# Patient Record
Sex: MALE | Race: WHITE | NOT HISPANIC OR LATINO | Employment: FULL TIME | URBAN - METROPOLITAN AREA
[De-identification: names, ages, dates, MRNs, and addresses within clinical notes are randomized per-mention and may not be internally consistent; named-entity substitution may affect disease eponyms.]

---

## 2018-01-23 ENCOUNTER — OFFICE VISIT CONVERTED (OUTPATIENT)
Dept: SURGERY | Facility: CLINIC | Age: 58
End: 2018-01-23
Attending: SURGERY

## 2018-07-16 ENCOUNTER — OFFICE VISIT CONVERTED (OUTPATIENT)
Dept: SURGERY | Facility: CLINIC | Age: 58
End: 2018-07-16
Attending: SURGERY

## 2018-08-27 ENCOUNTER — OFFICE VISIT CONVERTED (OUTPATIENT)
Dept: SURGERY | Facility: CLINIC | Age: 58
End: 2018-08-27
Attending: SURGERY

## 2021-05-16 VITALS — WEIGHT: 225.5 LBS | BODY MASS INDEX: 29.88 KG/M2 | RESPIRATION RATE: 16 BRPM | HEIGHT: 73 IN

## 2021-05-16 VITALS — HEIGHT: 73 IN | BODY MASS INDEX: 29.16 KG/M2 | RESPIRATION RATE: 14 BRPM | WEIGHT: 220 LBS

## 2021-05-16 VITALS — RESPIRATION RATE: 14 BRPM | BODY MASS INDEX: 29.83 KG/M2 | HEIGHT: 73 IN | WEIGHT: 225.12 LBS

## 2022-06-01 ENCOUNTER — OFFICE VISIT (OUTPATIENT)
Dept: FAMILY MEDICINE CLINIC | Facility: CLINIC | Age: 62
End: 2022-06-01

## 2022-06-01 ENCOUNTER — LAB (OUTPATIENT)
Dept: LAB | Facility: HOSPITAL | Age: 62
End: 2022-06-01

## 2022-06-01 ENCOUNTER — HOSPITAL ENCOUNTER (OUTPATIENT)
Dept: GENERAL RADIOLOGY | Facility: HOSPITAL | Age: 62
Discharge: HOME | End: 2022-06-01

## 2022-06-01 VITALS
TEMPERATURE: 98 F | DIASTOLIC BLOOD PRESSURE: 83 MMHG | HEART RATE: 51 BPM | BODY MASS INDEX: 29.58 KG/M2 | OXYGEN SATURATION: 98 % | SYSTOLIC BLOOD PRESSURE: 137 MMHG | HEIGHT: 73 IN | WEIGHT: 223.2 LBS | RESPIRATION RATE: 20 BRPM

## 2022-06-01 DIAGNOSIS — Z12.5 SCREENING FOR MALIGNANT NEOPLASM OF PROSTATE: ICD-10-CM

## 2022-06-01 DIAGNOSIS — M25.561 ACUTE PAIN OF RIGHT KNEE: ICD-10-CM

## 2022-06-01 DIAGNOSIS — Z13.220 SCREENING, LIPID: ICD-10-CM

## 2022-06-01 DIAGNOSIS — M25.561 ACUTE PAIN OF RIGHT KNEE: Primary | ICD-10-CM

## 2022-06-01 DIAGNOSIS — M25.361 KNEE INSTABILITY, RIGHT: ICD-10-CM

## 2022-06-01 DIAGNOSIS — Z13.29 SCREENING FOR THYROID DISORDER: ICD-10-CM

## 2022-06-01 DIAGNOSIS — B37.49 YEAST DERMATITIS OF PENIS: ICD-10-CM

## 2022-06-01 PROBLEM — K21.9 ESOPHAGEAL REFLUX: Status: ACTIVE | Noted: 2022-06-01

## 2022-06-01 LAB
BASOPHILS # BLD AUTO: 0.05 10*3/MM3 (ref 0–0.2)
BASOPHILS NFR BLD AUTO: 0.9 % (ref 0–1.5)
DEPRECATED RDW RBC AUTO: 42.5 FL (ref 37–54)
EOSINOPHIL # BLD AUTO: 0.11 10*3/MM3 (ref 0–0.4)
EOSINOPHIL NFR BLD AUTO: 1.9 % (ref 0.3–6.2)
ERYTHROCYTE [DISTWIDTH] IN BLOOD BY AUTOMATED COUNT: 12.8 % (ref 12.3–15.4)
HCT VFR BLD AUTO: 46.1 % (ref 37.5–51)
HGB BLD-MCNC: 15.8 G/DL (ref 13–17.7)
IMM GRANULOCYTES # BLD AUTO: 0.02 10*3/MM3 (ref 0–0.05)
IMM GRANULOCYTES NFR BLD AUTO: 0.3 % (ref 0–0.5)
LYMPHOCYTES # BLD AUTO: 2.41 10*3/MM3 (ref 0.7–3.1)
LYMPHOCYTES NFR BLD AUTO: 41.3 % (ref 19.6–45.3)
MCH RBC QN AUTO: 31.6 PG (ref 26.6–33)
MCHC RBC AUTO-ENTMCNC: 34.3 G/DL (ref 31.5–35.7)
MCV RBC AUTO: 92.2 FL (ref 79–97)
MONOCYTES # BLD AUTO: 0.62 10*3/MM3 (ref 0.1–0.9)
MONOCYTES NFR BLD AUTO: 10.6 % (ref 5–12)
NEUTROPHILS NFR BLD AUTO: 2.62 10*3/MM3 (ref 1.7–7)
NEUTROPHILS NFR BLD AUTO: 45 % (ref 42.7–76)
NRBC BLD AUTO-RTO: 0 /100 WBC (ref 0–0.2)
PLATELET # BLD AUTO: 220 10*3/MM3 (ref 140–450)
PMV BLD AUTO: 10.1 FL (ref 6–12)
RBC # BLD AUTO: 5 10*6/MM3 (ref 4.14–5.8)
WBC NRBC COR # BLD: 5.83 10*3/MM3 (ref 3.4–10.8)

## 2022-06-01 PROCEDURE — 80061 LIPID PANEL: CPT

## 2022-06-01 PROCEDURE — 84439 ASSAY OF FREE THYROXINE: CPT

## 2022-06-01 PROCEDURE — 99204 OFFICE O/P NEW MOD 45 MIN: CPT

## 2022-06-01 PROCEDURE — G0103 PSA SCREENING: HCPCS

## 2022-06-01 PROCEDURE — 73562 X-RAY EXAM OF KNEE 3: CPT

## 2022-06-01 PROCEDURE — 80050 GENERAL HEALTH PANEL: CPT

## 2022-06-01 PROCEDURE — 36415 COLL VENOUS BLD VENIPUNCTURE: CPT

## 2022-06-01 PROCEDURE — 84550 ASSAY OF BLOOD/URIC ACID: CPT

## 2022-06-01 RX ORDER — FLUCONAZOLE 150 MG/1
150 TABLET ORAL ONCE
Qty: 1 TABLET | Refills: 0 | Status: SHIPPED | OUTPATIENT
Start: 2022-06-01 | End: 2022-06-01

## 2022-06-01 NOTE — PROGRESS NOTES
There is a small joint effusion which is fluid collection on the knee. This can be caused by arthritis, meniscal injury or ligament injury. This may heal on its own over time or if it continues to be painful over the next few weeks we can try to pull the fluid off with a needle or do the MRI to determine if there is a tear or not in the meniscus.

## 2022-06-01 NOTE — PROGRESS NOTES
Chief Complaint   Patient presents with   • Establish Care     Hasn't been seeing anyone for primary care.    • Knee Pain     Right knee doesn't really hurt but it doesn't feel right, when he stands up to walk he has to take a second to step, started bothering him on Friday, doesn't recall injuring knee.        Subjective          Dat Valencia presents to [facility]    He's here to establish care today. He is having knee achiness. He doesn't know what he did to his knee. He is a  and thinks maybe he stepped on it wrong. He states it started bothering him on Friday. He doesn't remember injuring it in any way. He states the pain is a dull ache. He stands up and needs to stand for a few seconds before taking off. He states it does give out on him sometimes.     He is also complaining of an itchy scrotum and penis sometimes. 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 Its possible he has a yeast infection. I'll try to treat this with diflucan for him.       Past History:  Medical History: has no past medical history on file.   Surgical History: has no past surgical history on file.   Family History: family history is not on file.   Social History: reports that he has never smoked. He has never used smokeless tobacco. He reports that he does not drink alcohol and does not use drugs.  Allergies: Patient has no known allergies.  (Not in a hospital admission)       Social History     Socioeconomic History   • Marital status:    Tobacco Use   • Smoking status: Never Smoker   • Smokeless tobacco: Never Used   Substance and Sexual Activity   • Alcohol use: Never   • Drug use: Never   • Sexual activity: Defer       Health Maintenance Due   Topic Date Due   • HEPATITIS C SCREENING  Never done       Objective     Vital Signs:   /83 (BP Location: Left arm, Patient Position: Sitting)   Pulse 51   Temp 98 °F (36.7 °C)  "(Infrared)   Resp 20   Ht 185.4 cm (73\")   Wt 101 kg (223 lb 3.2 oz)   SpO2 98%   BMI 29.45 kg/m²       Physical Exam  Constitutional:       Appearance: Normal appearance.   HENT:      Nose: Nose normal.      Mouth/Throat:      Mouth: Mucous membranes are moist.   Cardiovascular:      Rate and Rhythm: Normal rate and regular rhythm.      Pulses: Normal pulses.   Pulmonary:      Effort: Pulmonary effort is normal.      Breath sounds: Normal breath sounds.   Musculoskeletal:         General: Signs of injury (right knee instability) present.   Skin:     General: Skin is warm and dry.      Findings: Erythema (scrotum) present.   Neurological:      General: No focal deficit present.      Mental Status: He is alert and oriented to person, place, and time.   Psychiatric:         Mood and Affect: Mood normal.         Behavior: Behavior normal.          Review of Systems   Musculoskeletal: Positive for arthralgias (right knee).        Result Review :                 Assessment and Plan    Diagnoses and all orders for this visit:    1. Acute pain of right knee (Primary)  -     CBC w AUTO Differential; Future  -     Comprehensive metabolic panel; Future  -     Uric acid; Future  -     XR Knee 3 View Right; Future    2. Knee instability, right  -     XR Knee 3 View Right; Future    3. Screening, lipid  -     Lipid panel; Future    4. Screening for thyroid disorder  -     TSH+Free T4; Future    5. Screening for malignant neoplasm of prostate  -     PSA SCREENING; Future    6. Yeast dermatitis of penis  -     fluconazole (Diflucan) 150 MG tablet; Take 1 tablet by mouth 1 (One) Time for 1 dose.  Dispense: 1 tablet; Refill: 0    I have ordered an xray of his knee but informed him he may need an MRI to assess his MCL. He is also going to get lab work done.         Pt thought to be clinically stable at this time.    Follow Up   Return in about 4 weeks (around 6/29/2022).  Patient was given instructions and counseling regarding " his condition or for health maintenance advice. Please see specific information pulled into the AVS if appropriate.

## 2022-06-02 LAB
ALBUMIN SERPL-MCNC: 4.4 G/DL (ref 3.5–5.2)
ALBUMIN/GLOB SERPL: 1.9 G/DL
ALP SERPL-CCNC: 54 U/L (ref 39–117)
ALT SERPL W P-5'-P-CCNC: 28 U/L (ref 1–41)
ANION GAP SERPL CALCULATED.3IONS-SCNC: 9.8 MMOL/L (ref 5–15)
AST SERPL-CCNC: 24 U/L (ref 1–40)
BILIRUB SERPL-MCNC: 0.3 MG/DL (ref 0–1.2)
BUN SERPL-MCNC: 13 MG/DL (ref 8–23)
BUN/CREAT SERPL: 15.1 (ref 7–25)
CALCIUM SPEC-SCNC: 9.5 MG/DL (ref 8.6–10.5)
CHLORIDE SERPL-SCNC: 106 MMOL/L (ref 98–107)
CHOLEST SERPL-MCNC: 174 MG/DL (ref 0–200)
CO2 SERPL-SCNC: 25.2 MMOL/L (ref 22–29)
CREAT SERPL-MCNC: 0.86 MG/DL (ref 0.76–1.27)
EGFRCR SERPLBLD CKD-EPI 2021: 99.1 ML/MIN/1.73
GLOBULIN UR ELPH-MCNC: 2.3 GM/DL
GLUCOSE SERPL-MCNC: 87 MG/DL (ref 65–99)
HDLC SERPL-MCNC: 46 MG/DL (ref 40–60)
LDLC SERPL CALC-MCNC: 100 MG/DL (ref 0–100)
LDLC/HDLC SERPL: 2.09 {RATIO}
POTASSIUM SERPL-SCNC: 4.4 MMOL/L (ref 3.5–5.2)
PROT SERPL-MCNC: 6.7 G/DL (ref 6–8.5)
PSA SERPL-MCNC: 0.73 NG/ML (ref 0–4)
SODIUM SERPL-SCNC: 141 MMOL/L (ref 136–145)
T4 FREE SERPL-MCNC: 0.84 NG/DL (ref 0.93–1.7)
TRIGL SERPL-MCNC: 159 MG/DL (ref 0–150)
TSH SERPL DL<=0.05 MIU/L-ACNC: 3.4 UIU/ML (ref 0.27–4.2)
URATE SERPL-MCNC: 6.1 MG/DL (ref 3.4–7)
VLDLC SERPL-MCNC: 28 MG/DL (ref 5–40)

## 2022-06-27 ENCOUNTER — OFFICE VISIT (OUTPATIENT)
Dept: FAMILY MEDICINE CLINIC | Facility: CLINIC | Age: 62
End: 2022-06-27

## 2022-06-27 VITALS
HEART RATE: 78 BPM | HEIGHT: 73 IN | WEIGHT: 222.1 LBS | BODY MASS INDEX: 29.43 KG/M2 | TEMPERATURE: 98.4 F | OXYGEN SATURATION: 99 % | SYSTOLIC BLOOD PRESSURE: 138 MMHG | DIASTOLIC BLOOD PRESSURE: 89 MMHG | RESPIRATION RATE: 20 BRPM

## 2022-06-27 DIAGNOSIS — M25.561 ACUTE PAIN OF RIGHT KNEE: Primary | ICD-10-CM

## 2022-06-27 DIAGNOSIS — L20.9 ATOPIC DERMATITIS OF SCALP: ICD-10-CM

## 2022-06-27 DIAGNOSIS — B35.6 JOCK ITCH: ICD-10-CM

## 2022-06-27 DIAGNOSIS — B37.2 YEAST DERMATITIS: ICD-10-CM

## 2022-06-27 DIAGNOSIS — M25.361 KNEE INSTABILITY, RIGHT: ICD-10-CM

## 2022-06-27 PROCEDURE — 99214 OFFICE O/P EST MOD 30 MIN: CPT

## 2022-06-27 RX ORDER — FLUOCINONIDE TOPICAL SOLUTION USP, 0.05% 0.5 MG/ML
SOLUTION TOPICAL 2 TIMES DAILY
Qty: 60 ML | Refills: 1 | Status: SHIPPED | OUTPATIENT
Start: 2022-06-27 | End: 2022-07-27

## 2022-06-27 RX ORDER — NYSTATIN 100000 [USP'U]/G
POWDER TOPICAL 3 TIMES DAILY
Qty: 60 G | Refills: 1 | Status: SHIPPED | OUTPATIENT
Start: 2022-06-27 | End: 2022-07-27

## 2022-06-27 RX ORDER — NYSTATIN AND TRIAMCINOLONE ACETONIDE 100000; 1 [USP'U]/G; MG/G
1 OINTMENT TOPICAL 2 TIMES DAILY
Qty: 60 G | Refills: 1 | Status: SHIPPED | OUTPATIENT
Start: 2022-06-27 | End: 2022-07-22

## 2022-06-27 RX ORDER — FLUOCINONIDE TOPICAL SOLUTION USP, 0.05% 0.5 MG/ML
SOLUTION TOPICAL 2 TIMES DAILY
COMMUNITY
End: 2022-06-27 | Stop reason: SDUPTHER

## 2022-06-27 RX ORDER — NYSTATIN 100000 U/G
1 CREAM TOPICAL 2 TIMES DAILY
COMMUNITY
End: 2022-08-10

## 2022-06-27 RX ORDER — ALCLOMETASONE DIPROPIONATE 0.5 MG/G
1 CREAM TOPICAL 2 TIMES DAILY
COMMUNITY
End: 2022-08-10

## 2022-06-27 NOTE — PROGRESS NOTES
Chief Complaint   Patient presents with   • Knee Pain     Right knee pain in the mornings, once he gets up and moving it gets better.    • Itching     Has itching in his groin area and has to use Nystatin and Alclometasone creams. 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        Subjective          Dat Valencia presents to [facility]    He is here for a follow up visit about his groin itching and knee pain. The knee pain is on the right knee. An Xray taken last time showed a small effusion. He still has knee instability so would like an MRI before aspirating the fluid.     He is also still having itching in his groin, under his scrotum, and between his penis and scrotum. He states the skin of his scrotum just feels different and thickened. He is a  and sits for at least 8 hours a day in a . He states he uses baby wipes on his groin every night that he cannot get to a shower.     He also has dermatitis in his scalp and has medication he uses for that. He needs a refill on that medication.      Past History:  Medical History: has no past medical history on file.   Surgical History: has no past surgical history on file.   Family History: family history is not on file.   Social History: reports that he has never smoked. He has never used smokeless tobacco. He reports that he does not drink alcohol and does not use drugs.  Allergies: Patient has no known allergies.  (Not in a hospital admission)       Social History     Socioeconomic History   • Marital status:    Tobacco Use   • Smoking status: Never Smoker   • Smokeless tobacco: Never Used   Substance and Sexual Activity   • Alcohol use: Never   • Drug use: Never   • Sexual activity: Defer       Health Maintenance Due   Topic Date Due   • HEPATITIS C SCREENING  Never done       Objective     Vital Signs:   /89 (BP Location:  "Left arm, Patient Position: Sitting)   Pulse 78   Temp 98.4 °F (36.9 °C) (Infrared)   Resp 20   Ht 185.4 cm (73\")   Wt 101 kg (222 lb 1.6 oz)   SpO2 99%   BMI 29.30 kg/m²       Physical Exam  Constitutional:       Appearance: Normal appearance.   HENT:      Nose: Nose normal.      Mouth/Throat:      Mouth: Mucous membranes are moist.   Cardiovascular:      Rate and Rhythm: Normal rate and regular rhythm.   Pulmonary:      Effort: Pulmonary effort is normal.      Breath sounds: Normal breath sounds.   Musculoskeletal:      Right knee: Tenderness present over the medial joint line.   Skin:     General: Skin is warm and dry.      Findings: Erythema (scrotum) present.   Neurological:      General: No focal deficit present.      Mental Status: He is alert and oriented to person, place, and time.   Psychiatric:         Mood and Affect: Mood normal.         Behavior: Behavior normal.          Review of Systems   Musculoskeletal: Positive for arthralgias (right knee).   Skin: Positive for rash (scrotum/groin).   All other systems reviewed and are negative.       Result Review :                 Assessment and Plan    Diagnoses and all orders for this visit:    1. Acute pain of right knee (Primary)  -     MRI Knee Right Without Contrast; Future    2. Knee instability, right  -     MRI Knee Right Without Contrast; Future    3. Jock itch  -     nystatin-triamcinolone (MYCOLOG) 437475-7.1 UNIT/GM-% ointment; Apply 1 application topically to the appropriate area as directed 2 (Two) Times a Day for 25 days.  Dispense: 60 g; Refill: 1  -     nystatin (MYCOSTATIN) 355614 UNIT/GM powder; Apply  topically to the appropriate area as directed 3 (Three) Times a Day for 30 days.  Dispense: 60 g; Refill: 1    4. Yeast dermatitis  -     nystatin-triamcinolone (MYCOLOG) 733120-5.1 UNIT/GM-% ointment; Apply 1 application topically to the appropriate area as directed 2 (Two) Times a Day for 25 days.  Dispense: 60 g; Refill: 1  -     " nystatin (MYCOSTATIN) 025333 UNIT/GM powder; Apply  topically to the appropriate area as directed 3 (Three) Times a Day for 30 days.  Dispense: 60 g; Refill: 1    5. Atopic dermatitis of scalp  -     fluocinonide (LIDEX) 0.05 % external solution; Apply  topically to the appropriate area as directed 2 (Two) Times a Day for 30 days.  Dispense: 60 mL; Refill: 1    If MRI comes back positive for any tearing, I will order referral to ortho. If it comes back negative we will proceed with aspiration of the effusion in the joint.       Pt thought to be clinically stable at this time.    Follow Up   Return if symptoms worsen or fail to improve.  Patient was given instructions and counseling regarding his condition or for health maintenance advice. Please see specific information pulled into the AVS if appropriate.

## 2022-07-11 ENCOUNTER — TELEPHONE (OUTPATIENT)
Dept: FAMILY MEDICINE CLINIC | Facility: CLINIC | Age: 62
End: 2022-07-11

## 2022-07-11 NOTE — TELEPHONE ENCOUNTER
Caller: LANCE BRIDGES    Relationship: Emergency Contact    Best call back number: [phone]    What orders are you requesting (i.e. lab or imaging): MRI WITHOUT CONTRAST OF RIGHT KNEE    In what timeframe would the patient need to come in: ASAP    Where will you receive your lab/imaging services: [facility]    Additional notes: PATIENT CAN ONLY BE SEEN ON MONDAYS

## 2022-07-11 NOTE — TELEPHONE ENCOUNTER
Left VM for patient to call back.  Referral is in for patient, need to confirm if we are switching the location

## 2022-08-01 ENCOUNTER — HOSPITAL ENCOUNTER (OUTPATIENT)
Dept: MRI IMAGING | Facility: HOSPITAL | Age: 62
Discharge: HOME | End: 2022-08-01

## 2022-08-01 DIAGNOSIS — M25.561 ACUTE PAIN OF RIGHT KNEE: ICD-10-CM

## 2022-08-01 DIAGNOSIS — S83.249A TEAR OF MEDIAL MENISCUS OF KNEE, CURRENT, UNSPECIFIED LATERALITY, UNSPECIFIED TEAR TYPE, INITIAL ENCOUNTER: Primary | ICD-10-CM

## 2022-08-01 DIAGNOSIS — M25.361 KNEE INSTABILITY, RIGHT: ICD-10-CM

## 2022-08-01 PROCEDURE — 73721 MRI JNT OF LWR EXTRE W/O DYE: CPT

## 2022-08-01 NOTE — PROGRESS NOTES
Please read him his results under the impression. All of these tears means he will be sent to orthopedic surgery

## 2022-08-08 ENCOUNTER — OFFICE VISIT (OUTPATIENT)
Dept: ORTHOPEDIC SURGERY | Facility: CLINIC | Age: 62
End: 2022-08-08

## 2022-08-08 ENCOUNTER — PREP FOR SURGERY (OUTPATIENT)
Dept: OTHER | Facility: HOSPITAL | Age: 62
End: 2022-08-08

## 2022-08-08 VITALS — OXYGEN SATURATION: 97 % | BODY MASS INDEX: 30.09 KG/M2 | HEART RATE: 81 BPM | WEIGHT: 227 LBS | HEIGHT: 73 IN

## 2022-08-08 DIAGNOSIS — S83.241A TEAR OF MEDIAL MENISCUS OF RIGHT KNEE, CURRENT, UNSPECIFIED TEAR TYPE, INITIAL ENCOUNTER: Primary | ICD-10-CM

## 2022-08-08 DIAGNOSIS — M17.11 PRIMARY OSTEOARTHRITIS OF RIGHT KNEE: ICD-10-CM

## 2022-08-08 PROCEDURE — 99203 OFFICE O/P NEW LOW 30 MIN: CPT | Performed by: STUDENT IN AN ORGANIZED HEALTH CARE EDUCATION/TRAINING PROGRAM

## 2022-08-08 RX ORDER — CEFAZOLIN SODIUM IN 0.9 % NACL 3 G/100 ML
3 INTRAVENOUS SOLUTION, PIGGYBACK (ML) INTRAVENOUS ONCE
Status: CANCELLED | OUTPATIENT
Start: 2022-08-08 | End: 2022-08-08

## 2022-08-08 RX ORDER — CEFAZOLIN SODIUM 2 G/100ML
2 INJECTION, SOLUTION INTRAVENOUS ONCE
Status: CANCELLED | OUTPATIENT
Start: 2022-08-08 | End: 2022-08-08

## 2022-08-08 NOTE — PROGRESS NOTES
"Chief Complaint  Pain and Initial Evaluation of the Right Knee     Subjective          Dat Valencia presents to [facility] for   History of Present Illness    The patient presents here today for evaluation of the right knee. The patient reports right knee pain that started a couple months ago without injury or trauma. He locates pain to the medial knee. He describes it as a sharp pain at times with popping in the knee. He denies previous surgeries to the knee. He reports pain gets worse throughout the day.  He has tried activity medication, oral, and topical medications without relief.  He works as a .    No Known Allergies     Social History     Socioeconomic History   • Marital status:    Tobacco Use   • Smoking status: Never Smoker   • Smokeless tobacco: Never Used   Substance and Sexual Activity   • Alcohol use: Never   • Drug use: Never   • Sexual activity: Defer        I reviewed the patient's chief complaint, history of present illness, review of systems, past medical history, surgical history, family history, social history, medications, and allergy list.     REVIEW OF SYSTEMS    Constitutional: Denies fevers, chills, weight loss  Cardiovascular: Denies chest pain, shortness of breath  Skin: Denies rashes, acute skin changes  Neurologic: Denies headache, loss of consciousness  MSK: Right knee pain      Objective   Vital Signs:   Pulse 81   Ht 185.4 cm (73\")   Wt 103 kg (227 lb)   SpO2 97%   BMI 29.95 kg/m²     Body mass index is 29.95 kg/m².    Physical Exam    General: Alert. No acute distress.   Right knee- Stable to varus/valgus stress. Stable to anterior/posterior drawer. Negative Lachman's. Positive Denise's to the medial knee with a pop. Neurovascularly intact. Positive EHL, FHL, GS and TA. Sensation intact to all 5 nerves of the foot. Positive pulses. Intact knee Extensor Mechanism. Good strength to hamstrings, quadriceps, dorsiflexors, and " plantar flexors.  Tender to the medial joint line. ROM -3 to 120 degrees. Positive crepitus. Slight varus alignment.     Procedures    Imaging Results (Most Recent)     None                   Assessment and Plan        MRI Knee Right Without Contrast    Result Date: 8/1/2022  Narrative: PROCEDURE: MRI KNEE RIGHT  WO CONTRAST  COMPARISON: None  INDICATIONS: RIGHT KNEE INSTABILITY, MEDIAL KNEE PAIN FOR SIX WEEKS      TECHNIQUE: A complete multi-planar MRI was performed.   FINDINGS:  The cruciate ligaments, collateral ligaments, and extensor mechanism of the knee are intact.  There is thickening of the medial collateral ligament with surrounding edema.  There is a complex tear of the posterior horn and body of the medial meniscus with extrusion.  There is degeneration of the lateral meniscus with a probable small vertical tear of the apex of the body with blunting present (series 4, image 11).  There is a small joint effusion.  There is a large partially ruptured Baker's cyst.  Moderate tricompartmental osteoarthritic changes are present, most pronounced within the medial compartment.  Diffuse near full-thickness cartilage loss is seen overlying the medial femoral condyle and the medial tibial plateau.  No full-thickness defect identified.  Partial thickness fissures are present within the lateral compartment and the patellofemoral compartment.  No significant intra-articular body identified.  No displaced osteochondral fragments are seen. No significant focal abnormal bone marrow signal is identified. The cortical margins are intact. No significant abnormal focal fluid collection is observed within the surrounding soft tissues.      Impression:   1. Complex tear of the posterior horn and body of the medial meniscus with extrusion. 2. Small vertical tear of the apex of the body of the lateral meniscus. 3. Moderate tricompartmental osteoarthritis, most pronounced within the medial compartment. 4. Thickening of the  medial collateral ligament with surrounding edema, likely reactive and related to remote sprain. 5.  Small joint effusion with a large partially ruptured Baker's cyst..     GAURAV ALMONTE MD       Electronically Signed and Approved By: GAURAV ALMONTE MD on 8/01/2022 at 8:11                Diagnoses and all orders for this visit:    1. Tear of medial meniscus of right knee, current, unspecified tear type, initial encounter (Primary)    2. Primary osteoarthritis of right knee        Discussed the treatment options with the patient, operative vs non-operative. Discussed the risks and benefits of a right knee arthroscopic partial medial menisectomy and chondroplasty. The patient expressed understanding and wished to proceed.        Discussed surgery., Risks/benefits discussed with patient including, but not limited to: infection, bleeding, neurovascular damage, re-rupture, aesthetic deformity, need for further surgery, and death., Discussed with patient the implant type being used during surgery and patient understands and desires to proceed., Surgery pamphlet given. and Call or return if worsening symptoms.    Scribed for Bry Betts MD by Corina Banks  08/08/2022   08:08 EDT         Follow Up   Return for 2 weeks postop .  Patient was given instructions and counseling regarding his condition or for health maintenance advice. Please see specific information pulled into the AVS if appropriate.       I have personally performed the services described in this document as scribed by the above individual and it is both accurate and complete.     Bry Betts MD  08/08/22  08:13 EDT

## 2022-08-08 NOTE — H&P (VIEW-ONLY)
"Chief Complaint  Pain and Initial Evaluation of the Right Knee     Subjective          Dat Valencia presents to [facility] for   History of Present Illness    The patient presents here today for evaluation of the right knee. The patient reports right knee pain that started a couple months ago without injury or trauma. He locates pain to the medial knee. He describes it as a sharp pain at times with popping in the knee. He denies previous surgeries to the knee. He reports pain gets worse throughout the day.  He has tried activity medication, oral, and topical medications without relief.  He works as a .    No Known Allergies     Social History     Socioeconomic History   • Marital status:    Tobacco Use   • Smoking status: Never Smoker   • Smokeless tobacco: Never Used   Substance and Sexual Activity   • Alcohol use: Never   • Drug use: Never   • Sexual activity: Defer        I reviewed the patient's chief complaint, history of present illness, review of systems, past medical history, surgical history, family history, social history, medications, and allergy list.     REVIEW OF SYSTEMS    Constitutional: Denies fevers, chills, weight loss  Cardiovascular: Denies chest pain, shortness of breath  Skin: Denies rashes, acute skin changes  Neurologic: Denies headache, loss of consciousness  MSK: Right knee pain      Objective   Vital Signs:   Pulse 81   Ht 185.4 cm (73\")   Wt 103 kg (227 lb)   SpO2 97%   BMI 29.95 kg/m²     Body mass index is 29.95 kg/m².    Physical Exam    General: Alert. No acute distress.   Right knee- Stable to varus/valgus stress. Stable to anterior/posterior drawer. Negative Lachman's. Positive Denise's to the medial knee with a pop. Neurovascularly intact. Positive EHL, FHL, GS and TA. Sensation intact to all 5 nerves of the foot. Positive pulses. Intact knee Extensor Mechanism. Good strength to hamstrings, quadriceps, dorsiflexors, and " plantar flexors.  Tender to the medial joint line. ROM -3 to 120 degrees. Positive crepitus. Slight varus alignment.     Procedures    Imaging Results (Most Recent)     None                   Assessment and Plan        MRI Knee Right Without Contrast    Result Date: 8/1/2022  Narrative: PROCEDURE: MRI KNEE RIGHT  WO CONTRAST  COMPARISON: None  INDICATIONS: RIGHT KNEE INSTABILITY, MEDIAL KNEE PAIN FOR SIX WEEKS      TECHNIQUE: A complete multi-planar MRI was performed.   FINDINGS:  The cruciate ligaments, collateral ligaments, and extensor mechanism of the knee are intact.  There is thickening of the medial collateral ligament with surrounding edema.  There is a complex tear of the posterior horn and body of the medial meniscus with extrusion.  There is degeneration of the lateral meniscus with a probable small vertical tear of the apex of the body with blunting present (series 4, image 11).  There is a small joint effusion.  There is a large partially ruptured Baker's cyst.  Moderate tricompartmental osteoarthritic changes are present, most pronounced within the medial compartment.  Diffuse near full-thickness cartilage loss is seen overlying the medial femoral condyle and the medial tibial plateau.  No full-thickness defect identified.  Partial thickness fissures are present within the lateral compartment and the patellofemoral compartment.  No significant intra-articular body identified.  No displaced osteochondral fragments are seen. No significant focal abnormal bone marrow signal is identified. The cortical margins are intact. No significant abnormal focal fluid collection is observed within the surrounding soft tissues.      Impression:   1. Complex tear of the posterior horn and body of the medial meniscus with extrusion. 2. Small vertical tear of the apex of the body of the lateral meniscus. 3. Moderate tricompartmental osteoarthritis, most pronounced within the medial compartment. 4. Thickening of the  medial collateral ligament with surrounding edema, likely reactive and related to remote sprain. 5.  Small joint effusion with a large partially ruptured Baker's cyst..     GAURAV ALMONTE MD       Electronically Signed and Approved By: GAURAV ALMOTNE MD on 8/01/2022 at 8:11                Diagnoses and all orders for this visit:    1. Tear of medial meniscus of right knee, current, unspecified tear type, initial encounter (Primary)    2. Primary osteoarthritis of right knee        Discussed the treatment options with the patient, operative vs non-operative. Discussed the risks and benefits of a right knee arthroscopic partial medial menisectomy and chondroplasty. The patient expressed understanding and wished to proceed.        Discussed surgery., Risks/benefits discussed with patient including, but not limited to: infection, bleeding, neurovascular damage, re-rupture, aesthetic deformity, need for further surgery, and death., Discussed with patient the implant type being used during surgery and patient understands and desires to proceed., Surgery pamphlet given. and Call or return if worsening symptoms.    Scribed for Bry Betts MD by Corina Banks  08/08/2022   08:08 EDT         Follow Up   Return for 2 weeks postop .  Patient was given instructions and counseling regarding his condition or for health maintenance advice. Please see specific information pulled into the AVS if appropriate.       I have personally performed the services described in this document as scribed by the above individual and it is both accurate and complete.     Bry Betts MD  08/08/22  08:13 EDT

## 2022-08-10 RX ORDER — ACETAMINOPHEN 500 MG
500 TABLET ORAL EVERY 6 HOURS PRN
COMMUNITY

## 2022-08-10 NOTE — PRE-PROCEDURE INSTRUCTIONS
Patient instructed to have no food past midnight, clears up to 2 hours prior to arrival time. Patient instructed to wear no lotions, jewelry or piercing's day of surgery. Patient to shower am of surgery with surgical soap.

## 2022-08-11 ENCOUNTER — LAB (OUTPATIENT)
Dept: LAB | Facility: HOSPITAL | Age: 62
End: 2022-08-11

## 2022-08-11 DIAGNOSIS — S83.241A TEAR OF MEDIAL MENISCUS OF RIGHT KNEE, CURRENT, UNSPECIFIED TEAR TYPE, INITIAL ENCOUNTER: ICD-10-CM

## 2022-08-11 LAB — SARS-COV-2 RNA PNL SPEC NAA+PROBE: NOT DETECTED

## 2022-08-11 PROCEDURE — U0004 COV-19 TEST NON-CDC HGH THRU: HCPCS

## 2022-08-16 ENCOUNTER — ANESTHESIA (OUTPATIENT)
Dept: PERIOP | Facility: HOSPITAL | Age: 62
End: 2022-08-16

## 2022-08-16 ENCOUNTER — HOSPITAL ENCOUNTER (OUTPATIENT)
Facility: HOSPITAL | Age: 62
Setting detail: HOSPITAL OUTPATIENT SURGERY
Discharge: HOME | End: 2022-08-16
Attending: STUDENT IN AN ORGANIZED HEALTH CARE EDUCATION/TRAINING PROGRAM | Admitting: STUDENT IN AN ORGANIZED HEALTH CARE EDUCATION/TRAINING PROGRAM

## 2022-08-16 ENCOUNTER — ANESTHESIA EVENT (OUTPATIENT)
Dept: PERIOP | Facility: HOSPITAL | Age: 62
End: 2022-08-16

## 2022-08-16 VITALS
SYSTOLIC BLOOD PRESSURE: 93 MMHG | OXYGEN SATURATION: 92 % | BODY MASS INDEX: 28.21 KG/M2 | TEMPERATURE: 97.2 F | DIASTOLIC BLOOD PRESSURE: 61 MMHG | HEIGHT: 74 IN | WEIGHT: 219.8 LBS | HEART RATE: 62 BPM | RESPIRATION RATE: 18 BRPM

## 2022-08-16 DIAGNOSIS — S83.241A TEAR OF MEDIAL MENISCUS OF RIGHT KNEE, CURRENT, UNSPECIFIED TEAR TYPE, INITIAL ENCOUNTER: ICD-10-CM

## 2022-08-16 PROCEDURE — 25010000002 FENTANYL CITRATE (PF) 50 MCG/ML SOLUTION: Performed by: NURSE ANESTHETIST, CERTIFIED REGISTERED

## 2022-08-16 PROCEDURE — 25010000002 ONDANSETRON PER 1 MG: Performed by: NURSE ANESTHETIST, CERTIFIED REGISTERED

## 2022-08-16 PROCEDURE — 29880 ARTHRS KNE SRG MNISECTMY M&L: CPT | Performed by: PHYSICIAN ASSISTANT

## 2022-08-16 PROCEDURE — 25010000002 MIDAZOLAM PER 1 MG: Performed by: ANESTHESIOLOGY

## 2022-08-16 PROCEDURE — 25010000002 CEFAZOLIN IN DEXTROSE 2-4 GM/100ML-% SOLUTION: Performed by: STUDENT IN AN ORGANIZED HEALTH CARE EDUCATION/TRAINING PROGRAM

## 2022-08-16 PROCEDURE — 29880 ARTHRS KNE SRG MNISECTMY M&L: CPT | Performed by: STUDENT IN AN ORGANIZED HEALTH CARE EDUCATION/TRAINING PROGRAM

## 2022-08-16 PROCEDURE — 25010000002 KETOROLAC TROMETHAMINE PER 15 MG: Performed by: NURSE ANESTHETIST, CERTIFIED REGISTERED

## 2022-08-16 PROCEDURE — 25010000002 DEXAMETHASONE PER 1 MG: Performed by: NURSE ANESTHETIST, CERTIFIED REGISTERED

## 2022-08-16 PROCEDURE — 25010000002 PROPOFOL 10 MG/ML EMULSION: Performed by: NURSE ANESTHETIST, CERTIFIED REGISTERED

## 2022-08-16 RX ORDER — DEXMEDETOMIDINE HYDROCHLORIDE 100 UG/ML
INJECTION, SOLUTION INTRAVENOUS AS NEEDED
Status: DISCONTINUED | OUTPATIENT
Start: 2022-08-16 | End: 2022-08-16 | Stop reason: SURG

## 2022-08-16 RX ORDER — MEPERIDINE HYDROCHLORIDE 25 MG/ML
12.5 INJECTION INTRAMUSCULAR; INTRAVENOUS; SUBCUTANEOUS
Status: DISCONTINUED | OUTPATIENT
Start: 2022-08-16 | End: 2022-08-16 | Stop reason: HOSPADM

## 2022-08-16 RX ORDER — MIDAZOLAM HYDROCHLORIDE 1 MG/ML
2 INJECTION INTRAMUSCULAR; INTRAVENOUS ONCE
Status: COMPLETED | OUTPATIENT
Start: 2022-08-16 | End: 2022-08-16

## 2022-08-16 RX ORDER — MAGNESIUM HYDROXIDE 1200 MG/15ML
LIQUID ORAL AS NEEDED
Status: DISCONTINUED | OUTPATIENT
Start: 2022-08-16 | End: 2022-08-16 | Stop reason: HOSPADM

## 2022-08-16 RX ORDER — PROMETHAZINE HYDROCHLORIDE 12.5 MG/1
25 TABLET ORAL ONCE AS NEEDED
Status: DISCONTINUED | OUTPATIENT
Start: 2022-08-16 | End: 2022-08-16 | Stop reason: HOSPADM

## 2022-08-16 RX ORDER — CEFAZOLIN SODIUM 2 G/100ML
2 INJECTION, SOLUTION INTRAVENOUS ONCE
Status: COMPLETED | OUTPATIENT
Start: 2022-08-16 | End: 2022-08-16

## 2022-08-16 RX ORDER — ONDANSETRON 2 MG/ML
INJECTION INTRAMUSCULAR; INTRAVENOUS AS NEEDED
Status: DISCONTINUED | OUTPATIENT
Start: 2022-08-16 | End: 2022-08-16 | Stop reason: SURG

## 2022-08-16 RX ORDER — ONDANSETRON 2 MG/ML
4 INJECTION INTRAMUSCULAR; INTRAVENOUS ONCE AS NEEDED
Status: DISCONTINUED | OUTPATIENT
Start: 2022-08-16 | End: 2022-08-16 | Stop reason: HOSPADM

## 2022-08-16 RX ORDER — HYDROCODONE BITARTRATE AND ACETAMINOPHEN 5; 325 MG/1; MG/1
1 TABLET ORAL EVERY 4 HOURS PRN
Qty: 30 TABLET | Refills: 0 | Status: SHIPPED | OUTPATIENT
Start: 2022-08-16 | End: 2023-03-27

## 2022-08-16 RX ORDER — CEFAZOLIN SODIUM IN 0.9 % NACL 3 G/100 ML
3 INTRAVENOUS SOLUTION, PIGGYBACK (ML) INTRAVENOUS ONCE
Status: DISCONTINUED | OUTPATIENT
Start: 2022-08-16 | End: 2022-08-16 | Stop reason: SDUPTHER

## 2022-08-16 RX ORDER — OXYCODONE HYDROCHLORIDE 5 MG/1
5 TABLET ORAL
Status: DISCONTINUED | OUTPATIENT
Start: 2022-08-16 | End: 2022-08-16 | Stop reason: HOSPADM

## 2022-08-16 RX ORDER — SODIUM CHLORIDE, SODIUM LACTATE, POTASSIUM CHLORIDE, CALCIUM CHLORIDE 600; 310; 30; 20 MG/100ML; MG/100ML; MG/100ML; MG/100ML
9 INJECTION, SOLUTION INTRAVENOUS CONTINUOUS PRN
Status: DISCONTINUED | OUTPATIENT
Start: 2022-08-16 | End: 2022-08-16 | Stop reason: HOSPADM

## 2022-08-16 RX ORDER — ACETAMINOPHEN 500 MG
1000 TABLET ORAL ONCE
Status: COMPLETED | OUTPATIENT
Start: 2022-08-16 | End: 2022-08-16

## 2022-08-16 RX ORDER — BUPIVACAINE HYDROCHLORIDE 2.5 MG/ML
INJECTION, SOLUTION EPIDURAL; INFILTRATION; INTRACAUDAL AS NEEDED
Status: DISCONTINUED | OUTPATIENT
Start: 2022-08-16 | End: 2022-08-16 | Stop reason: HOSPADM

## 2022-08-16 RX ORDER — ONDANSETRON 4 MG/1
4 TABLET, FILM COATED ORAL EVERY 8 HOURS PRN
Qty: 30 TABLET | Refills: 0 | Status: SHIPPED | OUTPATIENT
Start: 2022-08-16

## 2022-08-16 RX ORDER — LIDOCAINE HYDROCHLORIDE 20 MG/ML
INJECTION, SOLUTION EPIDURAL; INFILTRATION; INTRACAUDAL; PERINEURAL AS NEEDED
Status: DISCONTINUED | OUTPATIENT
Start: 2022-08-16 | End: 2022-08-16 | Stop reason: SURG

## 2022-08-16 RX ORDER — KETOROLAC TROMETHAMINE 30 MG/ML
INJECTION, SOLUTION INTRAMUSCULAR; INTRAVENOUS AS NEEDED
Status: DISCONTINUED | OUTPATIENT
Start: 2022-08-16 | End: 2022-08-16 | Stop reason: SURG

## 2022-08-16 RX ORDER — DEXAMETHASONE SODIUM PHOSPHATE 4 MG/ML
INJECTION, SOLUTION INTRA-ARTICULAR; INTRALESIONAL; INTRAMUSCULAR; INTRAVENOUS; SOFT TISSUE AS NEEDED
Status: DISCONTINUED | OUTPATIENT
Start: 2022-08-16 | End: 2022-08-16 | Stop reason: SURG

## 2022-08-16 RX ORDER — FENTANYL CITRATE 50 UG/ML
INJECTION, SOLUTION INTRAMUSCULAR; INTRAVENOUS AS NEEDED
Status: DISCONTINUED | OUTPATIENT
Start: 2022-08-16 | End: 2022-08-16 | Stop reason: SURG

## 2022-08-16 RX ORDER — DOCUSATE SODIUM 100 MG/1
100 CAPSULE, LIQUID FILLED ORAL 2 TIMES DAILY PRN
Qty: 20 CAPSULE | Refills: 0 | Status: SHIPPED | OUTPATIENT
Start: 2022-08-16

## 2022-08-16 RX ORDER — PROPOFOL 10 MG/ML
VIAL (ML) INTRAVENOUS AS NEEDED
Status: DISCONTINUED | OUTPATIENT
Start: 2022-08-16 | End: 2022-08-16 | Stop reason: SURG

## 2022-08-16 RX ORDER — PROMETHAZINE HYDROCHLORIDE 25 MG/1
25 SUPPOSITORY RECTAL ONCE AS NEEDED
Status: DISCONTINUED | OUTPATIENT
Start: 2022-08-16 | End: 2022-08-16 | Stop reason: HOSPADM

## 2022-08-16 RX ADMIN — KETOROLAC TROMETHAMINE 30 MG: 30 INJECTION, SOLUTION INTRAMUSCULAR; INTRAVENOUS at 07:23

## 2022-08-16 RX ADMIN — DEXAMETHASONE SODIUM PHOSPHATE 4 MG: 4 INJECTION, SOLUTION INTRA-ARTICULAR; INTRALESIONAL; INTRAMUSCULAR; INTRAVENOUS; SOFT TISSUE at 07:23

## 2022-08-16 RX ADMIN — FENTANYL CITRATE 100 MCG: 50 INJECTION, SOLUTION INTRAMUSCULAR; INTRAVENOUS at 07:15

## 2022-08-16 RX ADMIN — LIDOCAINE HYDROCHLORIDE 50 MG: 20 INJECTION, SOLUTION EPIDURAL; INFILTRATION; INTRACAUDAL; PERINEURAL at 07:22

## 2022-08-16 RX ADMIN — DEXMEDETOMIDINE HYDROCHLORIDE 200 MCG: 100 INJECTION, SOLUTION, CONCENTRATE INTRAVENOUS at 07:38

## 2022-08-16 RX ADMIN — SODIUM CHLORIDE, POTASSIUM CHLORIDE, SODIUM LACTATE AND CALCIUM CHLORIDE 9 ML/HR: 600; 310; 30; 20 INJECTION, SOLUTION INTRAVENOUS at 06:44

## 2022-08-16 RX ADMIN — CEFAZOLIN SODIUM 2 G: 2 INJECTION, SOLUTION INTRAVENOUS at 07:14

## 2022-08-16 RX ADMIN — PROPOFOL 150 MG: 10 INJECTION, EMULSION INTRAVENOUS at 07:15

## 2022-08-16 RX ADMIN — MIDAZOLAM HYDROCHLORIDE 2 MG: 2 INJECTION, SOLUTION INTRAMUSCULAR; INTRAVENOUS at 06:58

## 2022-08-16 RX ADMIN — LIDOCAINE HYDROCHLORIDE 50 MG: 20 INJECTION, SOLUTION EPIDURAL; INFILTRATION; INTRACAUDAL; PERINEURAL at 07:15

## 2022-08-16 RX ADMIN — ACETAMINOPHEN 1000 MG: 500 TABLET ORAL at 06:44

## 2022-08-16 RX ADMIN — ONDANSETRON 4 MG: 2 INJECTION INTRAMUSCULAR; INTRAVENOUS at 07:23

## 2022-08-16 NOTE — DISCHARGE INSTRUCTIONS
[facility]  Postop Instructions  Outpatient Knee Surgery    DIET  Begin with clear liquids and light foods (jello, soups, etc).  Progress to your normal diet if you are not nauseated.  Take pain medicine with food - crackers, bread, etc.    WOUND CARE  Maintain your operative dressing, loosen bandage if swelling or tingling of the ankle or toes.  It is normal for the knee to bleed and swell from the surgery site.  If blood soaks onto the bandage, do not become alarmed; reinforce with additional dressing.  If blood saturates more than 2 bandages call [facility].  Remove surgical dressing on the 2nd post-operative day.  If minimal drainage is present, apply bandaids over incisions.  Do not use antibiotic ointment.  To avoid infection, keep surgical incisions clean and dry.  You may shower on the 2nd day.  No immersion of operative leg until instructed by staff.    MEDICATIONS  Pain medication is injected into the wound and knee joint during surgery.  This will wear off within 8-12 hours.  Aleve 500mg 2 times per day may be taken for pain if you do not have a history of bleeding or ulcers.  Some patients will require narcotic pain medication for a short period of time.  This can be taken as per directions on the bottle.  Potential narcotic side effects include: constipation, nausea, vomiting, sleepiness.  If nausea and vomiting continue for more than 12-24 hours, contact the office to have your medication changed.  Do not drive a car or operate machinery while taking the prescription pain medication.  Increase vegetables, whole grains, and water intake to decrease risk of constipation related to pain medications.  Drink a full glass of water with every dose of medication (prescription or over the counter) and take with food.    ACTIVITY  When sleeping or resting, elevate the leg with the support of a few pillows at the ankle to reduce swelling and pain.  Do not engage in impact activities which increase  pain/swelling over the first 7-10 days following surgery.  Avoid long periods of sitting or long distance traveling for 2 weeks.  No driving or operating heavy machinery until you are off all prescription pain medications.  You may return to sedentary work or school 1-3 days after surgery, if pain is tolerable.          WEIGHT BEARING and RANGE OF MOTION  Meniscectomy / chondroplasty: Weight bearing as tolerated.      ICE THERAPY  Begin icing immediately after surgery using the compression machine or cold packs.    EXERCISE (see below)  Knee flexion as tolerated 5-10 minutes, 4 times per day.  Begin knee exercises the following day after surgery unless otherwise instructed by the surgeon  Towel Roll extensions 3 times per day for 20 minutes  Knee Flexion Progression 3 times per day  Straight Leg Raises- Hold for 2 seconds, repeat 10 reps, 3 times per day  You may also begin ankle and foot range of motion on the first post-operative day about 2-3 times per day.            Straight Leg Raises                                        Towel Extensions                           EMERGENCIES  Contact [facility] if any of the following are present:  Painful swelling or numbness, tingling, color change, or coolness in the ankle or foot  Unrelenting pain  Fever over 101 (It is normal to have a low grad fever for the first day or two following surgery)  Redness or worsening pain around incisions  Continuous drainage or bleeding from incision (a small amount of drainage is expected)  Difficulty breathing, wheezing  Excessive nausea/vomiting causing inability to keep anything down for 12-24 hours  Inability to urinate    WHAT TO EXPECT AT YOUR FIRST POST OPERATIVE VISIT  Suture/stitches will be removed and new bandage applied.  Follow up Xrays will be taken if indicated.  Next follow-up visit will usually be scheduled for 4-6 weeks     DISCHARGE INSTRUCTIONS  SURGICAL / AMBULATORY  PROCEDURES      For your surgery you  had:  General anesthesia (you may have a sore throat for the first 24 hours)  You may experience dizziness, drowsiness, or light-headedness for several hours following surgery/procedure.  Do not stay alone today or tonight.  Limit your activity for 24 hours.  Resume your diet slowly.  Follow whatever special dietary instructions you may have been given by your doctor.  You should not drive or operate machinery, drink alcohol, or sign legally binding documents for 24 hours or while you are taking pain medication.  Last dose of pain medication was given at:   .  NOTIFY YOUR DOCTOR IF YOU EXPERIENCE ANY OF THE FOLLOWING:  Temperature greater than 101 degrees Fahrenheit  Shaking Chills  Redness or excessive drainage from incision  Nausea, vomiting and/or pain that is not controlled by prescribed medications  Increase in bleeding or bleeding that is excessive  Unable to urinate in 6 hours after surgery  If unable to reach your doctor, please go to the closest Emergency Room  SPECIAL INSTRUCTIONS:  MAY TAKE AN OVER THE COUNTER STOOL SOFTENER AS NEEDED

## 2022-08-16 NOTE — ANESTHESIA POSTPROCEDURE EVALUATION
Patient: Dat Valencia    Procedure Summary     Date: 08/16/22 Room / Location: [facility]  / [facility]    Anesthesia Start: 0711 Anesthesia Stop: 0757    Procedure: RIGHT KNEE ARTHROSCOPY WITH PARTIAL MEDIALAND PARTIAL LATERAL MENISCECTOMY ,CHONDROPLASTY (Right ) Diagnosis:       Tear of medial meniscus of right knee, current, unspecified tear type, initial encounter      (Tear of medial meniscus of right knee, current, unspecified tear type, initial encounter [S83.867A])    Surgeons: Bry Betts MD Provider: Krunal Tariq MD    Anesthesia Type: general ASA Status: 2          Anesthesia Type: general    Vitals  Vitals Value Taken Time   BP 82/59 08/16/22 0827   Temp 36.4 °C (97.6 °F) 08/16/22 0812   Pulse 61 08/16/22 0828   Resp 18 08/16/22 0812   SpO2 92 % 08/16/22 0828   Vitals shown include unvalidated device data.        Post Anesthesia Care and Evaluation    Patient location during evaluation: bedside  Patient participation: complete - patient participated  Level of consciousness: awake  Pain management: adequate    Airway patency: patent  Anesthetic complications: No anesthetic complications  PONV Status: none  Cardiovascular status: acceptable and stable  Respiratory status: acceptable and room air  Hydration status: acceptable    Comments: An Anesthesiologist personally participated in the most demanding procedures (including induction and emergence if applicable) in the anesthesia plan, monitored the course of anesthesia administration at frequent intervals and remained physically present and available for immediate diagnosis and treatment of emergencies.

## 2022-08-16 NOTE — INTERVAL H&P NOTE
H&P reviewed. The patient was examined and there are no changes to the H&P.       I have seen and examined the above patient and agree with the plan.     Cardiac: Intact peripheral pulses  Pulmonary: Nonlabored respirations on room air.   Right lower extremity: Skin intact.  Distal neurovascular intact.      We reviewed the risk, benefits, indications, and alternatives to right knee arthroscopy with partial medial meniscectomy and possible chondroplasty.  Patient elected to proceed and consent was obtained.      Electronically signed by Bry Betts MD, 08/16/22, 6:39 AM EDT.

## 2022-08-16 NOTE — OP NOTE
KNEE ARTHROSCOPY WITH MENISCECTOMY  Procedure Report    Patient Name:  Dat Valencia  YOB: 1960    Date of Surgery:  8/16/2022     Indications: Mr. Valencia is a 62-year-old male with a history of right knee pain.  This was atraumatic in onset.  History and exam are concerning for a medial meniscus tear.  He attempted nonoperative measures without relief.  An MRI was obtained and confirmed a medial meniscus tear as well as a possible lateral meniscus tear.  He had areas of chondrosis throughout the knee as well.  We discussed treatment options.  We discussed both operative and nonoperative management.  The risk, benefits, indications, and alternatives to right knee arthroscopy with partial medial meniscectomy and possible chondroplasty were discussed with the patient.  This was previously documented.  He elected to proceed with surgery and consent was obtained.    Pre-op Diagnosis:   Tear of medial meniscus of right knee, current, unspecified tear type, initial encounter [S83.241A]       Post-Op Diagnosis Codes:     * Tear of medial meniscus of right knee, current, unspecified tear type, initial encounter [S83.241A]    Procedure/CPT® Codes:      Procedure(s):  RIGHT KNEE ARTHROSCOPY WITH PARTIAL MEDIAL AND PARTIAL LATERAL MENISCECTOMY ,CHONDROPLASTY    Staff:  Surgeon(s):  Bry Betts MD    Assistant: JOHNY Simmons    Anesthesia: General    Estimated Blood Loss: 20 mL    Implants:    Nothing was implanted during the procedure    Specimen:          None        Findings: Grade 2 chondrosis patellofemoral compartment, grade 2 and central grade 3 chondrosis 50% weightbearing portion medial femoral condyle, grade 2 and central grade 3 chondrosis 50% medial tibial plateau, complex tear medial meniscus posterior horn and body, grade 2 chondrosis central 30% lateral tibial plateau, radial tear lateral meniscus body    Complications: None    Description of Procedure: The patient was met in the  preoperative holding area and the operative extremity was marked.  The patient was transported to the operating room and laid supine on the operating room table.  General anesthesia was induced without complication.  An upper thigh tourniquet was applied but not used during the case.  The right leg was placed into a leg staley and the foot of the table was dropped.  All extremities were well padded.  2 g of preoperative Ancef were administered.  The right right lower extremity was then prepped and draped in the usual sterile fashion.  A timeout was taken to ensure the appropriate patient, procedure, and procedural site.  All were in agreement.  I began by marking the superficial landmarks over the anterior aspect of the right knee.  A vertical incision for a standard lateral portal was created and the arthroscope was inserted into the patellofemoral compartment.  Grade 2 chondral changes were noticed in the patellofemoral compartment. The arthroscope was transitioned into the lateral gutter and no loose bodies were identified.  The arthroscope was transitioned into the medial gutter and no loose bodies were identified.  The arthroscope was then transitioned into the medial compartment.  A vertical incision for a medial working portal was created after needle localization.  The arthroscopic shaver was introduced and the fat pad was debrided.  A probe was introduced in the medial compartment was evaluated.    Grade 2 and grade 3 chondral changes were noted both on the medial femoral condyle and medial tibial plateau.  A medial meniscus tear involving the posterior horn and body was identified.  I used the arthroscopic meniscal biter and shaver to perform a partial medial meniscectomy taking this tear back to a stable border.  I removed the majority of the posterior horn and tapered this into the body.  I utilized the arthroscopic shaver to perform a chondroplasty on the medial femoral condyle, removing loose/unstable  cartilage flaps. I was satisfied with the work in the medial compartment.  The arthroscope was transitioned into the notch.  The ACL and PCL were identified and were intact.  I then transitioned into the lateral compartment.    Grade 2 chondral changes were noted on the lateral tibial plateau.  A radial tear of the lateral meniscus body was identified.  I utilized the arthroscopic shaver to perform a partial lateral meniscectomy.  I removed the inner 10% of the lateral meniscus body and tapered this into the anterior and posterior horn. I then transitioned back into the patellofemoral compartment.  The knee was then thoroughly irrigated and the arthroscopic instrumentation was removed.  The knee was drained of arthroscopic fluid.  Portal sites were closed with 4-0 nylon in interrupted fashion.  I injected 30 cc of 0.5% Marcaine into the portal sites.  The wound was dressed with Xeroform, fluffs, soft roll, and an Ace wrap from the foot to the proximal thigh.  The patient awoke from anesthesia without complication and was transferred to the recovery room in stable condition.  All surgical counts were correct.  The patient had a palpable dorsalis pedis pulse prior to leaving the operating room.    Assistant: JOHNY Simmons was present and her skilled assistance was necessary for patient positioning, manipulating the camera, closing, dressing application.      Bry Betts MD     Date: 8/16/2022  Time: 07:59 EDT

## 2022-08-16 NOTE — ANESTHESIA PREPROCEDURE EVALUATION
Anesthesia Evaluation     Patient summary reviewed and Nursing notes reviewed   no history of anesthetic complications:  NPO Solid Status: > 8 hours  NPO Liquid Status: > 2 hours           Airway   Mallampati: II  TM distance: >3 FB  Neck ROM: full  No difficulty expected  Dental    (+) edentulous    Pulmonary - negative pulmonary ROS and normal exam    breath sounds clear to auscultation  Cardiovascular - negative cardio ROS and normal exam  Exercise tolerance: good (4-7 METS)    Rhythm: regular  Rate: normal        Neuro/Psych- negative ROS  GI/Hepatic/Renal/Endo    (+)  GERD,      Musculoskeletal (-) negative ROS    Abdominal    Substance History - negative use     OB/GYN negative ob/gyn ROS         Other - negative ROS                       Anesthesia Plan    ASA 2     general       Anesthetic plan, risks, benefits, and alternatives have been provided, discussed and informed consent has been obtained with: patient and spouse/significant other.        CODE STATUS:

## 2022-08-19 ENCOUNTER — TELEPHONE (OUTPATIENT)
Dept: FAMILY MEDICINE CLINIC | Facility: CLINIC | Age: 62
End: 2022-08-19

## 2022-08-19 NOTE — TELEPHONE ENCOUNTER
Caller: Dat Valencia    Relationship: Self    Best call back number: [phone]    What is the best time to reach you: ANYTIME     Who are you requesting to speak with (clinical staff, provider,  specific staff member): CLINICAL       What was the call regarding:       THE PATIENT SAID HIS KNEE IS FEELING GOOD AND HE IS NOT TAKING ANY PAIN MEDICATION BECAUSE HE DO NOT NEED IT. HE SAID HE IS NOT IN ANY PAIN. HE SAID IT GETS A LITTLE STIFF BUT NO PAIN. THE PATIENT SAID HE IS READY TO GO BACK TO WORK. 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   HE SAID HE WILL NEED TO GET HIS STITCHES  TAKEN OUT. HE SAID HE WILL NEED DOCUMENTATION FROM PCP TO RELEASE HIM TO GO BACK TO WORK. HE IS WANTING TO PICK IT UP Monday      PLEASE CALL TO DISCUSS .         Do you require a callback: YES

## 2022-08-22 NOTE — TELEPHONE ENCOUNTER
Spoke with patient, let him know to contact his surgeons office to verify when he could get stitches out since they are the ones who put them in and to ask them when he could return to work. His next follow up with surgeon is 08/29  Patient verbalized understanding

## 2022-08-29 ENCOUNTER — OFFICE VISIT (OUTPATIENT)
Dept: ORTHOPEDIC SURGERY | Facility: CLINIC | Age: 62
End: 2022-08-29

## 2022-08-29 VITALS — WEIGHT: 220 LBS | HEIGHT: 74 IN | BODY MASS INDEX: 28.23 KG/M2

## 2022-08-29 DIAGNOSIS — M17.11 PRIMARY OSTEOARTHRITIS OF RIGHT KNEE: ICD-10-CM

## 2022-08-29 DIAGNOSIS — Z98.890 S/P ARTHROSCOPIC KNEE SURGERY: Primary | ICD-10-CM

## 2022-08-29 PROCEDURE — 99024 POSTOP FOLLOW-UP VISIT: CPT | Performed by: PHYSICIAN ASSISTANT

## 2022-08-29 NOTE — PROGRESS NOTES
"Chief Complaint  Follow-up of the Right Knee    Subjective          Dat Valencia presents to [facility] for   History of Present Illness     Dat Valencia presents today for follow up of his right knee. Patient is 2 weeks post operative right knee arthroscopy with partial medial and partial lateral meniscectomy and chondroplasty performed by Dr. Betts on 2022. Today, patient states he is doing well.  He reports no pain.  He is not taking any medications for pain.  He states he has been working on his home exercises and noticed improvement in his range of motion.  He affirms minimal swelling.  Denies numbness or tingling.  Denies complications, redness, or drainage from his incision sites.  Denies fever or chills.      No Known Allergies     Social History     Socioeconomic History   • Marital status:    Tobacco Use   • Smoking status: Former Smoker     Quit date:      Years since quittin.6   • Smokeless tobacco: Never Used   Vaping Use   • Vaping Use: Never used   Substance and Sexual Activity   • Alcohol use: Never   • Drug use: Never   • Sexual activity: Defer        I reviewed the patient's chief complaint, history of present illness, review of systems, past medical history, surgical history, family history, social history, medications, and allergy list.     REVIEW OF SYSTEMS    Constitutional: Denies fevers, chills, weight loss  Cardiovascular: Denies chest pain, shortness of breath  Skin: Denies rashes, acute skin changes  Neurologic: Denies headache, loss of consciousness  MSK: Right knee pain      Objective   Vital Signs:   Ht 188 cm (74\")   Wt 99.8 kg (220 lb)   BMI 28.25 kg/m²     Body mass index is 28.25 kg/m².    Physical Exam    General: Alert, no acute distress  Right lower extremity: Sutures removed today without complications.  Well-healing arthroscopic portals about the right knee. No redness or active drainage noted.  Minimal knee " effusion.  No tenderness to the medial joint line.  No tenderness to the lateral joint line.  115 degrees of flexion.  5 degrees shy of full knee extension. Knee extensor mechanism intact. Knee stable to varus and valgus stress. Calf soft, nontender. Demonstrates active ankle plantarflexion and dorsiflexion. Sensation intact over the dorsal and plantar foot.  Palpable pedal pulses.       Procedures    Imaging Results (Most Recent)     None                   Assessment and Plan    Diagnoses and all orders for this visit:    1. S/P arthroscopic knee surgery (Primary)    2. Primary osteoarthritis of right knee         Dat Valencia presents today 2 weeks postop right knee arthroscopy with partial medial and partial lateral meniscectomy and chondroplasty performed Dr. Betts on 8/16/2022.  Sutures removed today without complications.  Incisions are well-healing.  No active drainage or redness noted.  No concerning signs of infection.  Patient is doing well and denies fever or chills.  Incision site care was reviewed today. Patient instructed not to soak or submerge incision. Do not apply any lotions, creams, or ointments to the incision at this time.  We discussed concerning signs and symptoms regarding the incision site.  Patient understood and agreed. We discussed formal physical therapy versus home exercises.  Patient understood and elected to proceed with home exercises.  We discussed the importance of these exercises to improve range of motion and strength.  Patient understood and agreed.  We discussed ice and elevation as needed for inflammation.  Work note written today.  Patient will follow up in 4 weeks for reevaluation. No new x-rays needed at next visit.       Call or return if symptoms worsen or patient has any concerns.       Follow Up   Return in about 4 weeks (around 9/26/2022).  Patient was given instructions and counseling regarding his condition or for health maintenance advice. Please see specific  information pulled into the AVS if appropriate.     Charlene He PA-C  08/29/22  10:23 EDT

## 2022-10-03 ENCOUNTER — OFFICE VISIT (OUTPATIENT)
Dept: ORTHOPEDIC SURGERY | Facility: CLINIC | Age: 62
End: 2022-10-03

## 2022-10-03 VITALS — BODY MASS INDEX: 28.23 KG/M2 | OXYGEN SATURATION: 98 % | WEIGHT: 220 LBS | HEIGHT: 74 IN | HEART RATE: 76 BPM

## 2022-10-03 DIAGNOSIS — Z98.890 S/P ARTHROSCOPIC KNEE SURGERY: Primary | ICD-10-CM

## 2022-10-03 DIAGNOSIS — M17.11 PRIMARY OSTEOARTHRITIS OF RIGHT KNEE: ICD-10-CM

## 2022-10-03 PROCEDURE — 99024 POSTOP FOLLOW-UP VISIT: CPT | Performed by: PHYSICIAN ASSISTANT

## 2022-10-03 RX ORDER — FLUOCINONIDE TOPICAL SOLUTION USP, 0.05% 0.5 MG/ML
SOLUTION TOPICAL 2 TIMES DAILY
COMMUNITY
Start: 2022-09-28 | End: 2022-10-31 | Stop reason: SDUPTHER

## 2022-10-03 NOTE — PROGRESS NOTES
"Chief Complaint  Follow-up and Pain of the Right Knee    Subjective          Dat Valencia presents to [facility] for   History of Present Illness    Dat Valencia presents today for a follow up of his right knee. Patient is 6 weeks post op right knee arthroscopy. Today, patient states he is doing well.  He reports no pain to the knee except with an occasional twisting position.  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  He denies swelling.  He is not having to take any medications for pain to the knee.  Denies numbness or tingling.  Denies new injuries.  He states his incisions are well-healed.      No Known Allergies     Social History     Socioeconomic History   • Marital status:    Tobacco Use   • Smoking status: Former Smoker     Quit date:      Years since quittin.7   • Smokeless tobacco: Never Used   Vaping Use   • Vaping Use: Never used   Substance and Sexual Activity   • Alcohol use: Never   • Drug use: Never   • Sexual activity: Defer        I reviewed the patient's chief complaint, history of present illness, review of systems, past medical history, surgical history, family history, social history, medications, and allergy list.     REVIEW OF SYSTEMS    Constitutional: Denies fevers, chills, weight loss  Cardiovascular: Denies chest pain, shortness of breath  Skin: Denies rashes, acute skin changes  Neurologic: Denies headache, loss of consciousness  MSK: Right knee pain      Objective   Vital Signs:   Pulse 76   Ht 188 cm (74\")   Wt 99.8 kg (220 lb)   SpO2 98%   BMI 28.25 kg/m²     Body mass index is 28.25 kg/m².    Physical Exam    General: Alert. No acute distress.   Right lower extremity: Well-healed arthroscopy portals.  Nontender to the medial or lateral joint line.  No swelling.  Just shy of full knee extension.  Knee flexion 120 degrees.  Knee stable to varus and valgus stress.  Knee extensor mechanism intact.  No " pain with Denise testing.  Calf soft, nontender.  Demonstrates active ankle plantarflexion and dorsiflexion.  Sensation intact over the dorsal and plantar foot.  Palpable pedal pulses.    Procedures    Imaging Results (Most Recent)     None                 Assessment and Plan    Diagnoses and all orders for this visit:    1. S/P arthroscopic knee surgery (Primary)    2. Primary osteoarthritis of right knee        Dat Valencia presents today 6 weeks post op right knee arthroscopy.  Patient is instructed to continue with his home exercises.  Continue working on range of motion and strength.  Continue with activities as tolerated.  Work note written today.  Patient will follow-up as needed.    Call or return if symptoms worsen or patient has any concerns.       Follow Up   Return if symptoms worsen or fail to improve.  Patient was given instructions and counseling regarding his condition or for health maintenance advice. Please see specific information pulled into the AVS if appropriate.     Charlene He PA-C  10/03/22  08:08 EDT

## 2022-10-31 ENCOUNTER — OFFICE VISIT (OUTPATIENT)
Dept: FAMILY MEDICINE CLINIC | Facility: CLINIC | Age: 62
End: 2022-10-31

## 2022-10-31 VITALS
RESPIRATION RATE: 20 BRPM | WEIGHT: 225.2 LBS | TEMPERATURE: 98.2 F | DIASTOLIC BLOOD PRESSURE: 97 MMHG | OXYGEN SATURATION: 96 % | SYSTOLIC BLOOD PRESSURE: 149 MMHG | HEIGHT: 74 IN | BODY MASS INDEX: 28.9 KG/M2 | HEART RATE: 75 BPM

## 2022-10-31 DIAGNOSIS — B35.6 JOCK ITCH: Primary | ICD-10-CM

## 2022-10-31 DIAGNOSIS — L20.9 ATOPIC DERMATITIS OF SCALP: ICD-10-CM

## 2022-10-31 DIAGNOSIS — B37.2 YEAST DERMATITIS: ICD-10-CM

## 2022-10-31 PROCEDURE — 99214 OFFICE O/P EST MOD 30 MIN: CPT

## 2022-10-31 RX ORDER — CLOTRIMAZOLE AND BETAMETHASONE DIPROPIONATE 10; .64 MG/G; MG/G
1 CREAM TOPICAL 2 TIMES DAILY
Qty: 42 G | Refills: 0 | Status: SHIPPED | OUTPATIENT
Start: 2022-10-31 | End: 2022-11-21

## 2022-10-31 RX ORDER — FLUOCINONIDE TOPICAL SOLUTION USP, 0.05% 0.5 MG/ML
SOLUTION TOPICAL 2 TIMES DAILY
Qty: 60 ML | Refills: 3 | Status: SHIPPED | OUTPATIENT
Start: 2022-10-31 | End: 2022-11-30

## 2022-10-31 RX ORDER — FLUCONAZOLE 200 MG/1
200 TABLET ORAL DAILY
Qty: 14 TABLET | Refills: 0 | Status: SHIPPED | OUTPATIENT
Start: 2022-10-31 | End: 2022-11-14

## 2022-10-31 RX ORDER — NYSTATIN AND TRIAMCINOLONE ACETONIDE 100000; 1 [USP'U]/G; MG/G
1 OINTMENT TOPICAL 2 TIMES DAILY
Qty: 50 G | Refills: 0 | Status: CANCELLED | OUTPATIENT
Start: 2022-10-31 | End: 2022-11-25

## 2022-10-31 NOTE — PROGRESS NOTES
Chief Complaint   Patient presents with   • Itching     Itching in groin for a couple of years, has been seen for this and will do as told and it will go away for a couple of months but then come back.        Subjective          Dat Valencia presents to [facility]    History of Present Illness  He is here to be seen for itching in the groin. He's had this for a couple of years now and has used different creams and powders. It will go away for a bit and then come right back. He does not want to see dermatology for this yet. He wants to try a different medication first.       Past History:  Medical History: has a past medical history of COVID-19 (2022) and Medial meniscus tear.   Surgical History: has a past surgical history that includes Appendectomy; Fracture surgery; Colonoscopy; and Knee arthroscopy w/ meniscectomy (Right, 2022).   Family History: family history is not on file.   Social History: reports that he quit smoking about 9 years ago. His smoking use included cigarettes. He started smoking about 36 years ago. He has a 13.50 pack-year smoking history. He has never used smokeless tobacco. He reports that he does not drink alcohol and does not use drugs.  Allergies: Patient has no known allergies.  (Not in a hospital admission)       Social History     Socioeconomic History   • Marital status:    Tobacco Use   • Smoking status: Former     Packs/day: 0.50     Years: 27.00     Pack years: 13.50     Types: Cigarettes     Start date:      Quit date: 2013     Years since quittin.8   • Smokeless tobacco: Never   Vaping Use   • Vaping Use: Never used   Substance and Sexual Activity   • Alcohol use: Never   • Drug use: Never   • Sexual activity: Defer       There are no preventive care reminders to display for this patient.    Objective     Vital Signs:   /97 (BP Location: Left arm, Patient Position: Sitting)   Pulse 75   Temp 98.2 °F (36.8 °C)  "(Infrared)   Resp 20   Ht 188 cm (74\")   Wt 102 kg (225 lb 3.2 oz)   SpO2 96%   BMI 28.91 kg/m²       Physical Exam  Constitutional:       Appearance: Normal appearance.   HENT:      Nose: Nose normal.      Mouth/Throat:      Mouth: Mucous membranes are moist.   Cardiovascular:      Rate and Rhythm: Normal rate and regular rhythm.   Pulmonary:      Effort: Pulmonary effort is normal.      Breath sounds: Normal breath sounds.   Skin:     General: Skin is warm and dry.      Findings: Erythema and rash present.   Neurological:      General: No focal deficit present.      Mental Status: He is alert and oriented to person, place, and time.   Psychiatric:         Mood and Affect: Mood normal.         Behavior: Behavior normal.          Review of Systems   Skin: Positive for rash.   All other systems reviewed and are negative.       Result Review :                 Assessment and Plan    Diagnoses and all orders for this visit:    1. Jock itch (Primary)  -     clotrimazole-betamethasone (Lotrisone) 1-0.05 % cream; Apply 1 application topically to the appropriate area as directed 2 (Two) Times a Day for 21 days.  Dispense: 42 g; Refill: 0  -     fluconazole (Diflucan) 200 MG tablet; Take 1 tablet by mouth Daily for 14 days.  Dispense: 14 tablet; Refill: 0    2. Yeast dermatitis  -     clotrimazole-betamethasone (Lotrisone) 1-0.05 % cream; Apply 1 application topically to the appropriate area as directed 2 (Two) Times a Day for 21 days.  Dispense: 42 g; Refill: 0  -     fluconazole (Diflucan) 200 MG tablet; Take 1 tablet by mouth Daily for 14 days.  Dispense: 14 tablet; Refill: 0    3. Atopic dermatitis of scalp  -     fluocinonide (LIDEX) 0.05 % external solution; Apply  topically to the appropriate area as directed 2 (Two) Times a Day for 30 days.  Dispense: 60 mL; Refill: 3  -     fluconazole (Diflucan) 200 MG tablet; Take 1 tablet by mouth Daily for 14 days.  Dispense: 14 tablet; Refill: 0    If not improving in the " next month he will need a referral to Dermatology.         Pt thought to be clinically stable at this time.    Follow Up   No follow-ups on file.  Patient was given instructions and counseling regarding his condition or for health maintenance advice. Please see specific information pulled into the AVS if appropriate.

## 2023-03-27 ENCOUNTER — OFFICE VISIT (OUTPATIENT)
Dept: FAMILY MEDICINE CLINIC | Facility: CLINIC | Age: 63
End: 2023-03-27
Payer: COMMERCIAL

## 2023-03-27 ENCOUNTER — LAB (OUTPATIENT)
Dept: LAB | Facility: HOSPITAL | Age: 63
End: 2023-03-27
Payer: COMMERCIAL

## 2023-03-27 ENCOUNTER — TELEPHONE (OUTPATIENT)
Dept: FAMILY MEDICINE CLINIC | Facility: CLINIC | Age: 63
End: 2023-03-27

## 2023-03-27 ENCOUNTER — HOSPITAL ENCOUNTER (OUTPATIENT)
Dept: GENERAL RADIOLOGY | Facility: HOSPITAL | Age: 63
Discharge: HOME | End: 2023-03-27
Payer: COMMERCIAL

## 2023-03-27 VITALS
HEART RATE: 76 BPM | DIASTOLIC BLOOD PRESSURE: 95 MMHG | HEIGHT: 74 IN | BODY MASS INDEX: 29.2 KG/M2 | WEIGHT: 227.5 LBS | OXYGEN SATURATION: 96 % | RESPIRATION RATE: 20 BRPM | TEMPERATURE: 98.4 F | SYSTOLIC BLOOD PRESSURE: 139 MMHG

## 2023-03-27 DIAGNOSIS — R03.0 ELEVATED BLOOD PRESSURE, SITUATIONAL: ICD-10-CM

## 2023-03-27 DIAGNOSIS — M25.561 ACUTE PAIN OF RIGHT KNEE: ICD-10-CM

## 2023-03-27 DIAGNOSIS — Z13.29 SCREENING FOR THYROID DISORDER: ICD-10-CM

## 2023-03-27 DIAGNOSIS — L29.9 ITCHY SCALP: ICD-10-CM

## 2023-03-27 DIAGNOSIS — B35.6 JOCK ITCH: Primary | ICD-10-CM

## 2023-03-27 DIAGNOSIS — Z13.220 SCREENING, LIPID: ICD-10-CM

## 2023-03-27 LAB
ALBUMIN SERPL-MCNC: 4.2 G/DL (ref 3.5–5.2)
ALBUMIN/GLOB SERPL: 1.6 G/DL
ALP SERPL-CCNC: 68 U/L (ref 39–117)
ALT SERPL W P-5'-P-CCNC: 33 U/L (ref 1–41)
ANION GAP SERPL CALCULATED.3IONS-SCNC: 8 MMOL/L (ref 5–15)
AST SERPL-CCNC: 26 U/L (ref 1–40)
BASOPHILS # BLD AUTO: 0.04 10*3/MM3 (ref 0–0.2)
BASOPHILS NFR BLD AUTO: 0.7 % (ref 0–1.5)
BILIRUB SERPL-MCNC: 0.4 MG/DL (ref 0–1.2)
BUN SERPL-MCNC: 10 MG/DL (ref 8–23)
BUN/CREAT SERPL: 10.4 (ref 7–25)
CALCIUM SPEC-SCNC: 8.8 MG/DL (ref 8.6–10.5)
CHLORIDE SERPL-SCNC: 106 MMOL/L (ref 98–107)
CHOLEST SERPL-MCNC: 171 MG/DL (ref 0–200)
CO2 SERPL-SCNC: 26 MMOL/L (ref 22–29)
CREAT SERPL-MCNC: 0.96 MG/DL (ref 0.76–1.27)
DEPRECATED RDW RBC AUTO: 41.2 FL (ref 37–54)
EGFRCR SERPLBLD CKD-EPI 2021: 90.5 ML/MIN/1.73
EOSINOPHIL # BLD AUTO: 0.16 10*3/MM3 (ref 0–0.4)
EOSINOPHIL NFR BLD AUTO: 2.7 % (ref 0.3–6.2)
ERYTHROCYTE [DISTWIDTH] IN BLOOD BY AUTOMATED COUNT: 12.3 % (ref 12.3–15.4)
GLOBULIN UR ELPH-MCNC: 2.7 GM/DL
GLUCOSE SERPL-MCNC: 108 MG/DL (ref 65–99)
HCT VFR BLD AUTO: 46.3 % (ref 37.5–51)
HDLC SERPL-MCNC: 43 MG/DL (ref 40–60)
HGB BLD-MCNC: 16.3 G/DL (ref 13–17.7)
IMM GRANULOCYTES # BLD AUTO: 0.06 10*3/MM3 (ref 0–0.05)
IMM GRANULOCYTES NFR BLD AUTO: 1 % (ref 0–0.5)
LDLC SERPL CALC-MCNC: 104 MG/DL (ref 0–100)
LDLC/HDLC SERPL: 2.36 {RATIO}
LYMPHOCYTES # BLD AUTO: 1.66 10*3/MM3 (ref 0.7–3.1)
LYMPHOCYTES NFR BLD AUTO: 28.2 % (ref 19.6–45.3)
MCH RBC QN AUTO: 32.3 PG (ref 26.6–33)
MCHC RBC AUTO-ENTMCNC: 35.2 G/DL (ref 31.5–35.7)
MCV RBC AUTO: 91.7 FL (ref 79–97)
MONOCYTES # BLD AUTO: 0.52 10*3/MM3 (ref 0.1–0.9)
MONOCYTES NFR BLD AUTO: 8.8 % (ref 5–12)
NEUTROPHILS NFR BLD AUTO: 3.44 10*3/MM3 (ref 1.7–7)
NEUTROPHILS NFR BLD AUTO: 58.6 % (ref 42.7–76)
NRBC BLD AUTO-RTO: 0 /100 WBC (ref 0–0.2)
PLATELET # BLD AUTO: 217 10*3/MM3 (ref 140–450)
PMV BLD AUTO: 10.1 FL (ref 6–12)
POTASSIUM SERPL-SCNC: 4.3 MMOL/L (ref 3.5–5.2)
PROT SERPL-MCNC: 6.9 G/DL (ref 6–8.5)
RBC # BLD AUTO: 5.05 10*6/MM3 (ref 4.14–5.8)
SODIUM SERPL-SCNC: 140 MMOL/L (ref 136–145)
T4 FREE SERPL-MCNC: 0.96 NG/DL (ref 0.93–1.7)
TRIGL SERPL-MCNC: 132 MG/DL (ref 0–150)
TSH SERPL DL<=0.05 MIU/L-ACNC: 2.98 UIU/ML (ref 0.27–4.2)
VLDLC SERPL-MCNC: 24 MG/DL (ref 5–40)
WBC NRBC COR # BLD: 5.88 10*3/MM3 (ref 3.4–10.8)

## 2023-03-27 PROCEDURE — 36415 COLL VENOUS BLD VENIPUNCTURE: CPT

## 2023-03-27 PROCEDURE — 73562 X-RAY EXAM OF KNEE 3: CPT

## 2023-03-27 PROCEDURE — 80050 GENERAL HEALTH PANEL: CPT

## 2023-03-27 PROCEDURE — 84439 ASSAY OF FREE THYROXINE: CPT

## 2023-03-27 PROCEDURE — 80061 LIPID PANEL: CPT

## 2023-03-27 RX ORDER — NYSTATIN 100000 U/G
1 CREAM TOPICAL 2 TIMES DAILY
COMMUNITY
End: 2023-03-27 | Stop reason: SDUPTHER

## 2023-03-27 RX ORDER — ALCLOMETASONE DIPROPIONATE 0.5 MG/G
1 CREAM TOPICAL 2 TIMES DAILY
Qty: 60 G | Refills: 2 | Status: SHIPPED | OUTPATIENT
Start: 2023-03-27

## 2023-03-27 RX ORDER — FLUOCINONIDE TOPICAL SOLUTION USP, 0.05% 0.5 MG/ML
SOLUTION TOPICAL 2 TIMES DAILY
COMMUNITY
End: 2023-03-27 | Stop reason: SDUPTHER

## 2023-03-27 RX ORDER — FLUOCINONIDE TOPICAL SOLUTION USP, 0.05% 0.5 MG/ML
SOLUTION TOPICAL 2 TIMES DAILY
Qty: 60 ML | Refills: 0 | Status: SHIPPED | OUTPATIENT
Start: 2023-03-27

## 2023-03-27 RX ORDER — NYSTATIN 100000 U/G
1 CREAM TOPICAL 2 TIMES DAILY
Qty: 30 G | Refills: 2 | Status: SHIPPED | OUTPATIENT
Start: 2023-03-27

## 2023-03-27 RX ORDER — ALCLOMETASONE DIPROPIONATE 0.5 MG/G
1 CREAM TOPICAL 2 TIMES DAILY
COMMUNITY
End: 2023-03-27 | Stop reason: SDUPTHER

## 2023-03-27 NOTE — PROGRESS NOTES
Chief Complaint   Patient presents with   • Knee Pain     Right knee pain, he's had surgery on his knee before but it is back to bothering him again.    • Med Refill     Needs refills on Nystatin, Fluocinonide, and Alclometasone cream.        Subjective          Dat Valencia presents to [facility]    History of Present Illness  Dat is here to be seen for knee pain on the right knee. He had surgery on it before to fix a torn meniscus. He has not had any issues since then but now is having pain again. He would like to have an xray taken. I have explained to him that this is not likely to show anything and he will need to see Ortho again for recheck of this fixed tear. He also needs medication refills on the creams he uses for jock itch and the itchy scalp. I have advised him that I think it is time to see dermatology for these issues because they never really go away.       Past History:  Medical History: has a past medical history of COVID-19 (01/2022) and Medial meniscus tear.   Surgical History: has a past surgical history that includes Appendectomy; Fracture surgery; Colonoscopy; and Knee arthroscopy w/ meniscectomy (Right, 8/16/2022).   Family History: family history is not on file.   Social History: reports that he quit smoking about 10 years ago. His smoking use included cigarettes. He started smoking about 37 years ago. He has a 13.50 pack-year smoking history. He has been exposed to tobacco smoke. He has never used smokeless tobacco. He reports that he does not drink alcohol and does not use drugs.  Allergies: Patient has no known allergies.  (Not in a hospital admission)       Social History     Socioeconomic History   • Marital status:    Tobacco Use   • Smoking status: Former     Packs/day: 0.50     Years: 27.00     Pack years: 13.50     Types: Cigarettes     Start date: 1986     Quit date: 2013     Years since quitting: 10.2     Passive exposure: Past   •  "Smokeless tobacco: Never   Vaping Use   • Vaping Use: Never used   Substance and Sexual Activity   • Alcohol use: Never   • Drug use: Never   • Sexual activity: Yes     Partners: Female       Health Maintenance Due   Topic Date Due   • HEPATITIS C SCREENING  Never done       Objective     Vital Signs:   /95 (BP Location: Left arm, Patient Position: Sitting)   Pulse 76   Temp 98.4 °F (36.9 °C) (Infrared)   Resp 20   Ht 188 cm (74\")   Wt 103 kg (227 lb 8 oz)   SpO2 96%   BMI 29.21 kg/m²       Physical Exam  Constitutional:       Appearance: Normal appearance.   HENT:      Nose: Nose normal.      Mouth/Throat:      Mouth: Mucous membranes are moist.   Cardiovascular:      Rate and Rhythm: Normal rate.      Pulses: Normal pulses.   Pulmonary:      Effort: Pulmonary effort is normal.   Skin:     General: Skin is warm and dry.   Neurological:      General: No focal deficit present.      Mental Status: He is alert and oriented to person, place, and time.   Psychiatric:         Mood and Affect: Mood normal.         Behavior: Behavior normal.          Review of Systems   All other systems reviewed and are negative.       Result Review :                 Assessment and Plan    Diagnoses and all orders for this visit:    1. Jock itch (Primary)  -     alclometasone (ACLOVATE) 0.05 % cream; Apply 1 application topically to the appropriate area as directed 2 (Two) Times a Day.  Dispense: 60 g; Refill: 2  -     nystatin (MYCOSTATIN) 952780 UNIT/GM cream; Apply 1 application topically to the appropriate area as directed 2 (Two) Times a Day.  Dispense: 30 g; Refill: 2  -     Ambulatory Referral to Dermatology    2. Itchy scalp  -     fluocinonide (LIDEX) 0.05 % external solution; Apply  topically to the appropriate area as directed 2 (Two) Times a Day.  Dispense: 60 mL; Refill: 0  -     Ambulatory Referral to Dermatology    3. Acute pain of right knee  -     XR Knee 3 View Right; Future    4. Screening, lipid  -     " Lipid panel; Future    5. Screening for thyroid disorder  -     TSH+Free T4; Future    6. Elevated blood pressure, situational  -     CBC w AUTO Differential; Future  -     Comprehensive metabolic panel; Future      I spent 30 minutes caring for Dat on this date of service. This time includes time spent by me in the following activities:preparing for the visit, reviewing tests, obtaining and/or reviewing a separately obtained history, performing a medically appropriate examination and/or evaluation , counseling and educating the patient/family/caregiver, ordering medications, tests, or procedures and documenting information in the medical record    Pt thought to be clinically stable at this time.    Follow Up   No follow-ups on file.  Patient was given instructions and counseling regarding his condition or for health maintenance advice. Please see specific information pulled into the AVS if appropriate.

## 2023-03-27 NOTE — TELEPHONE ENCOUNTER
Pharmacy sent request stating Alclometasone 0.05% cream isn't covered.  Preferred alternatives are Hydrocort cream, desonide cream, fluocinacet cream.  Please send an alternative to the pharmacy.

## 2023-03-29 NOTE — PROGRESS NOTES
Labs WNL aside from glucose which is 108. This could be elevated if he ate at any time before labs that day

## 2025-01-27 ENCOUNTER — OFFICE VISIT (OUTPATIENT)
Dept: FAMILY MEDICINE CLINIC | Facility: CLINIC | Age: 65
End: 2025-01-27
Payer: COMMERCIAL

## 2025-01-27 ENCOUNTER — LAB (OUTPATIENT)
Dept: LAB | Facility: HOSPITAL | Age: 65
End: 2025-01-27
Payer: COMMERCIAL

## 2025-01-27 VITALS
DIASTOLIC BLOOD PRESSURE: 92 MMHG | TEMPERATURE: 97.8 F | BODY MASS INDEX: 29.9 KG/M2 | SYSTOLIC BLOOD PRESSURE: 165 MMHG | OXYGEN SATURATION: 100 % | HEART RATE: 65 BPM | WEIGHT: 233 LBS | HEIGHT: 74 IN

## 2025-01-27 DIAGNOSIS — Z12.5 SCREENING FOR PROSTATE CANCER: ICD-10-CM

## 2025-01-27 DIAGNOSIS — Z13.220 SCREENING FOR LIPID DISORDERS: ICD-10-CM

## 2025-01-27 DIAGNOSIS — Z11.59 NEED FOR HEPATITIS C SCREENING TEST: ICD-10-CM

## 2025-01-27 DIAGNOSIS — Z00.00 ANNUAL PHYSICAL EXAM: Primary | ICD-10-CM

## 2025-01-27 DIAGNOSIS — N50.89 SWELLING OF THE TESTICLES: ICD-10-CM

## 2025-01-27 DIAGNOSIS — Z00.00 ANNUAL PHYSICAL EXAM: ICD-10-CM

## 2025-01-27 DIAGNOSIS — Z13.29 SCREENING FOR THYROID DISORDER: ICD-10-CM

## 2025-01-27 LAB
ALBUMIN SERPL-MCNC: 4 G/DL (ref 3.5–5.2)
ALBUMIN/GLOB SERPL: 1.2 G/DL
ALP SERPL-CCNC: 72 U/L (ref 39–117)
ALT SERPL W P-5'-P-CCNC: 35 U/L (ref 1–41)
ANION GAP SERPL CALCULATED.3IONS-SCNC: 10.2 MMOL/L (ref 5–15)
AST SERPL-CCNC: 21 U/L (ref 1–40)
BASOPHILS # BLD AUTO: 0.02 10*3/MM3 (ref 0–0.2)
BASOPHILS NFR BLD AUTO: 0.3 % (ref 0–1.5)
BILIRUB SERPL-MCNC: 0.3 MG/DL (ref 0–1.2)
BUN SERPL-MCNC: 10 MG/DL (ref 8–23)
BUN/CREAT SERPL: 11.4 (ref 7–25)
CALCIUM SPEC-SCNC: 9.4 MG/DL (ref 8.6–10.5)
CHLORIDE SERPL-SCNC: 103 MMOL/L (ref 98–107)
CHOLEST SERPL-MCNC: 168 MG/DL (ref 0–200)
CO2 SERPL-SCNC: 26.8 MMOL/L (ref 22–29)
CREAT SERPL-MCNC: 0.88 MG/DL (ref 0.76–1.27)
DEPRECATED RDW RBC AUTO: 41.3 FL (ref 37–54)
EGFRCR SERPLBLD CKD-EPI 2021: 97.2 ML/MIN/1.73
EOSINOPHIL # BLD AUTO: 0.15 10*3/MM3 (ref 0–0.4)
EOSINOPHIL NFR BLD AUTO: 2.2 % (ref 0.3–6.2)
ERYTHROCYTE [DISTWIDTH] IN BLOOD BY AUTOMATED COUNT: 12.3 % (ref 12.3–15.4)
GLOBULIN UR ELPH-MCNC: 3.4 GM/DL
GLUCOSE SERPL-MCNC: 89 MG/DL (ref 65–99)
HCT VFR BLD AUTO: 48.1 % (ref 37.5–51)
HCV AB SER QL: NORMAL
HDLC SERPL-MCNC: 44 MG/DL (ref 40–60)
HGB BLD-MCNC: 16.9 G/DL (ref 13–17.7)
IMM GRANULOCYTES # BLD AUTO: 0.02 10*3/MM3 (ref 0–0.05)
IMM GRANULOCYTES NFR BLD AUTO: 0.3 % (ref 0–0.5)
LDLC SERPL CALC-MCNC: 96 MG/DL (ref 0–100)
LDLC/HDLC SERPL: 2.09 {RATIO}
LYMPHOCYTES # BLD AUTO: 2.08 10*3/MM3 (ref 0.7–3.1)
LYMPHOCYTES NFR BLD AUTO: 30.7 % (ref 19.6–45.3)
MCH RBC QN AUTO: 32.5 PG (ref 26.6–33)
MCHC RBC AUTO-ENTMCNC: 35.1 G/DL (ref 31.5–35.7)
MCV RBC AUTO: 92.5 FL (ref 79–97)
MONOCYTES # BLD AUTO: 0.6 10*3/MM3 (ref 0.1–0.9)
MONOCYTES NFR BLD AUTO: 8.9 % (ref 5–12)
NEUTROPHILS NFR BLD AUTO: 3.9 10*3/MM3 (ref 1.7–7)
NEUTROPHILS NFR BLD AUTO: 57.6 % (ref 42.7–76)
NRBC BLD AUTO-RTO: 0 /100 WBC (ref 0–0.2)
PLATELET # BLD AUTO: 238 10*3/MM3 (ref 140–450)
PMV BLD AUTO: 10.2 FL (ref 6–12)
POTASSIUM SERPL-SCNC: 4.5 MMOL/L (ref 3.5–5.2)
PROT SERPL-MCNC: 7.4 G/DL (ref 6–8.5)
PSA SERPL-MCNC: 0.9 NG/ML (ref 0–4)
RBC # BLD AUTO: 5.2 10*6/MM3 (ref 4.14–5.8)
SODIUM SERPL-SCNC: 140 MMOL/L (ref 136–145)
TRIGL SERPL-MCNC: 161 MG/DL (ref 0–150)
TSH SERPL DL<=0.05 MIU/L-ACNC: 2.83 UIU/ML (ref 0.27–4.2)
VLDLC SERPL-MCNC: 28 MG/DL (ref 5–40)
WBC NRBC COR # BLD AUTO: 6.77 10*3/MM3 (ref 3.4–10.8)

## 2025-01-27 PROCEDURE — 36415 COLL VENOUS BLD VENIPUNCTURE: CPT

## 2025-01-27 PROCEDURE — 99396 PREV VISIT EST AGE 40-64: CPT

## 2025-01-27 PROCEDURE — 86803 HEPATITIS C AB TEST: CPT

## 2025-01-27 PROCEDURE — G0103 PSA SCREENING: HCPCS

## 2025-01-27 PROCEDURE — 80050 GENERAL HEALTH PANEL: CPT

## 2025-01-27 PROCEDURE — 80061 LIPID PANEL: CPT

## 2025-01-27 NOTE — PROGRESS NOTES
Chief Complaint     Groin Pain (For about 2-3 mths, swelling of the testicles that comes and goes ) and Annual Exam    History of Present Illness     Dat Valencia is a 64 y.o. male who presents to [facility] for evaluation of groin pain the last 2-3 months. He has swelling of the testicles that comes and goes. He does not have the swelling today.      He is also due for his annual exam and we will get that done today.      History      Past Medical History:   Diagnosis Date    COVID-19 01/2022    Medial meniscus tear        Past Surgical History:   Procedure Laterality Date    APPENDECTOMY      COLONOSCOPY      FRACTURE SURGERY      arm    KNEE ARTHROSCOPY W/ MENISCECTOMY Right 8/16/2022    Procedure: RIGHT KNEE ARTHROSCOPY WITH PARTIAL MEDIALAND PARTIAL LATERAL MENISCECTOMY ,CHONDROPLASTY;  Surgeon: Bry Betts MD;  Location: [facility] OR [facility] [place];  Service: Orthopedics;  Laterality: Right;       Family History   Problem Relation Age of Onset    Malig Hyperthermia Neg Hx         Current Medications        Current Outpatient Medications:     acetaminophen (TYLENOL) 500 MG tablet, Take 1 tablet by mouth Every 6 (Six) Hours As Needed for Mild Pain., Disp: , Rfl:     alclometasone (ACLOVATE) 0.05 % cream, Apply 1 application topically to the appropriate area as directed 2 (Two) Times a Day., Disp: 60 g, Rfl: 2    fluocinonide (LIDEX) 0.05 % external solution, Apply  topically to the appropriate area as directed 2 (Two) Times a Day., Disp: 60 mL, Rfl: 0    nystatin (MYCOSTATIN) 063600 UNIT/GM cream, Apply 1 application topically to the appropriate area as directed 2 (Two) Times a Day., Disp: 30 g, Rfl: 2     Allergies     No Known Allergies    Social History       Social History     Social History Narrative    Not on file       Immunizations     Immunization:    There is no immunization history on file for this patient.       Objective     Objective     Vital Signs:   /92 (BP  "Location: Left arm, Patient Position: Sitting, Cuff Size: Adult)   Pulse 65   Temp 97.8 °F (36.6 °C) (Temporal)   Ht 188 cm (74\")   Wt 106 kg (233 lb)   SpO2 100%   BMI 29.92 kg/m²       Physical Exam  Constitutional:       Appearance: Normal appearance.   HENT:      Nose: Nose normal.      Mouth/Throat:      Mouth: Mucous membranes are moist.   Cardiovascular:      Rate and Rhythm: Normal rate and regular rhythm.      Pulses: Normal pulses.      Heart sounds: Normal heart sounds.   Pulmonary:      Effort: Pulmonary effort is normal.      Breath sounds: Normal breath sounds.   Skin:     General: Skin is warm and dry.   Neurological:      General: No focal deficit present.      Mental Status: He is alert and oriented to person, place, and time.   Psychiatric:         Mood and Affect: Mood normal.         Behavior: Behavior normal.         Results    The following data was reviewed by: GILBERTO Saenz on 01/27/2025:             Assessment and Plan        Assessment and Plan    Diagnoses and all orders for this visit:    1. Annual physical exam (Primary)  -     Comprehensive Metabolic Panel; Future  -     CBC & Differential; Future    2. Screening for thyroid disorder  -     TSH; Future    3. Screening for lipid disorders  -     Lipid Panel; Future    4. Screening for prostate cancer  -     PSA Screen; Future    5. Need for hepatitis C screening test  -     Hepatitis C Antibody; Future    6. Swelling of the testicles  -     Ambulatory Referral to Urology              Follow Up        Follow Up   Return if symptoms worsen or fail to improve.  Patient was given instructions and counseling regarding his condition or for health maintenance advice. Please see specific information pulled into the AVS if appropriate.    Answers submitted by the patient for this visit:  Problem not listed (Submitted on 1/27/2025)  Chief Complaint: Other medical problem  Reason for appointment: Lower groin pain,  abdominal pain: " No  anorexia: No  joint pain: No  change in stool: No  chest pain: No  chills: No  nasal congestion: No  cough: No  diaphoresis: No  fatigue: No  fever: No  headaches: No  joint swelling: No  myalgias: No  nausea: No  neck pain: No  numbness: No  rash: No  sore throat: No  swollen glands: No  dysuria: No  vertigo: No  visual change: No  vomiting: No  weakness: No  Onset: 1 to 6 months  Chronicity: new  Frequency: constantly  Medications tried: None

## 2025-07-28 ENCOUNTER — LAB (OUTPATIENT)
Dept: LAB | Facility: HOSPITAL | Age: 65
End: 2025-07-28
Payer: COMMERCIAL

## 2025-07-28 ENCOUNTER — OFFICE VISIT (OUTPATIENT)
Dept: FAMILY MEDICINE CLINIC | Facility: CLINIC | Age: 65
End: 2025-07-28
Payer: COMMERCIAL

## 2025-07-28 VITALS
OXYGEN SATURATION: 98 % | WEIGHT: 227.6 LBS | HEART RATE: 61 BPM | BODY MASS INDEX: 29.21 KG/M2 | DIASTOLIC BLOOD PRESSURE: 84 MMHG | HEIGHT: 74 IN | TEMPERATURE: 98 F | SYSTOLIC BLOOD PRESSURE: 129 MMHG

## 2025-07-28 DIAGNOSIS — M25.471 BILATERAL SWELLING OF FEET AND ANKLES: ICD-10-CM

## 2025-07-28 DIAGNOSIS — L03.115 CELLULITIS OF RIGHT LOWER EXTREMITY: Primary | ICD-10-CM

## 2025-07-28 DIAGNOSIS — M25.474 BILATERAL SWELLING OF FEET AND ANKLES: ICD-10-CM

## 2025-07-28 DIAGNOSIS — M25.472 BILATERAL SWELLING OF FEET AND ANKLES: ICD-10-CM

## 2025-07-28 DIAGNOSIS — L03.115 CELLULITIS OF RIGHT LOWER EXTREMITY: ICD-10-CM

## 2025-07-28 DIAGNOSIS — M25.475 BILATERAL SWELLING OF FEET AND ANKLES: ICD-10-CM

## 2025-07-28 LAB
ALBUMIN SERPL-MCNC: 4.3 G/DL (ref 3.5–5.2)
ALBUMIN/GLOB SERPL: 1.5 G/DL
ALP SERPL-CCNC: 76 U/L (ref 39–117)
ALT SERPL W P-5'-P-CCNC: 41 U/L (ref 1–41)
ANION GAP SERPL CALCULATED.3IONS-SCNC: 9.4 MMOL/L (ref 5–15)
AST SERPL-CCNC: 23 U/L (ref 1–40)
BASOPHILS # BLD AUTO: 0.02 10*3/MM3 (ref 0–0.2)
BASOPHILS NFR BLD AUTO: 0.3 % (ref 0–1.5)
BILIRUB SERPL-MCNC: 0.7 MG/DL (ref 0–1.2)
BUN SERPL-MCNC: 11 MG/DL (ref 8–23)
BUN/CREAT SERPL: 13.3 (ref 7–25)
CALCIUM SPEC-SCNC: 9.1 MG/DL (ref 8.6–10.5)
CHLORIDE SERPL-SCNC: 105 MMOL/L (ref 98–107)
CO2 SERPL-SCNC: 24.6 MMOL/L (ref 22–29)
CREAT SERPL-MCNC: 0.83 MG/DL (ref 0.76–1.27)
DEPRECATED RDW RBC AUTO: 41.3 FL (ref 37–54)
EGFRCR SERPLBLD CKD-EPI 2021: 98.3 ML/MIN/1.73
EOSINOPHIL # BLD AUTO: 0.14 10*3/MM3 (ref 0–0.4)
EOSINOPHIL NFR BLD AUTO: 2.1 % (ref 0.3–6.2)
ERYTHROCYTE [DISTWIDTH] IN BLOOD BY AUTOMATED COUNT: 12.3 % (ref 12.3–15.4)
GLOBULIN UR ELPH-MCNC: 2.9 GM/DL
GLUCOSE SERPL-MCNC: 101 MG/DL (ref 65–99)
HCT VFR BLD AUTO: 45.7 % (ref 37.5–51)
HGB BLD-MCNC: 15.8 G/DL (ref 13–17.7)
IMM GRANULOCYTES # BLD AUTO: 0.04 10*3/MM3 (ref 0–0.05)
IMM GRANULOCYTES NFR BLD AUTO: 0.6 % (ref 0–0.5)
LYMPHOCYTES # BLD AUTO: 2.03 10*3/MM3 (ref 0.7–3.1)
LYMPHOCYTES NFR BLD AUTO: 30.3 % (ref 19.6–45.3)
MCH RBC QN AUTO: 32 PG (ref 26.6–33)
MCHC RBC AUTO-ENTMCNC: 34.6 G/DL (ref 31.5–35.7)
MCV RBC AUTO: 92.7 FL (ref 79–97)
MONOCYTES # BLD AUTO: 0.49 10*3/MM3 (ref 0.1–0.9)
MONOCYTES NFR BLD AUTO: 7.3 % (ref 5–12)
NEUTROPHILS NFR BLD AUTO: 3.97 10*3/MM3 (ref 1.7–7)
NEUTROPHILS NFR BLD AUTO: 59.4 % (ref 42.7–76)
NRBC BLD AUTO-RTO: 0 /100 WBC (ref 0–0.2)
PLATELET # BLD AUTO: 212 10*3/MM3 (ref 140–450)
PMV BLD AUTO: 9.9 FL (ref 6–12)
POTASSIUM SERPL-SCNC: 4.4 MMOL/L (ref 3.5–5.2)
PROT SERPL-MCNC: 7.2 G/DL (ref 6–8.5)
RBC # BLD AUTO: 4.93 10*6/MM3 (ref 4.14–5.8)
SODIUM SERPL-SCNC: 139 MMOL/L (ref 136–145)
WBC NRBC COR # BLD AUTO: 6.69 10*3/MM3 (ref 3.4–10.8)

## 2025-07-28 PROCEDURE — 36415 COLL VENOUS BLD VENIPUNCTURE: CPT

## 2025-07-28 PROCEDURE — 85025 COMPLETE CBC W/AUTO DIFF WBC: CPT

## 2025-07-28 PROCEDURE — 80053 COMPREHEN METABOLIC PANEL: CPT

## 2025-07-28 PROCEDURE — 99213 OFFICE O/P EST LOW 20 MIN: CPT

## 2025-07-28 RX ORDER — DESONIDE 0.5 MG/G
1 CREAM TOPICAL 2 TIMES DAILY
COMMUNITY
Start: 2025-05-19

## 2025-07-28 RX ORDER — HALOBETASOL PROPIONATE 0.05 %
1 OINTMENT (GRAM) TOPICAL 2 TIMES DAILY
COMMUNITY
Start: 2025-05-21

## 2025-07-28 NOTE — PROGRESS NOTES
Chief Complaint     Edema (Bilateral ankle edema)    Patient or patient representative verbalized consent for the use of Ambient Listening during the visit with  GILBERTO Saenz for chart documentation. 7/28/2025  11:15 EDT    History of Present Illness     Dat Valencia is a 65 y.o. male who presents to [facility] .    History of Present Illness  The patient presents for evaluation of ankle swelling.    He reports persistent swelling in his ankle, which was particularly severe last Friday. He also mentions a minor injury to the same area. He sought medical attention at an urgent care facility where he was prescribed cephalexin, an antibiotic. He is currently on cephalexin.    Additionally, he expresses concern about his blood sugar levels due to a family history of diabetes.    FAMILY HISTORY  The patient has a family history of diabetes.         History      Past Medical History:   Diagnosis Date    COVID-19 01/2022    Medial meniscus tear        Past Surgical History:   Procedure Laterality Date    APPENDECTOMY      COLONOSCOPY      FRACTURE SURGERY      arm    KNEE ARTHROSCOPY W/ MENISCECTOMY Right 8/16/2022    Procedure: RIGHT KNEE ARTHROSCOPY WITH PARTIAL MEDIALAND PARTIAL LATERAL MENISCECTOMY ,CHONDROPLASTY;  Surgeon: Bry Betts MD;  Location: [facility] OR [facility] [place];  Service: Orthopedics;  Laterality: Right;       Family History   Problem Relation Age of Onset    Malig Hyperthermia Neg Hx         Current Medications        Current Outpatient Medications:     acetaminophen (TYLENOL) 500 MG tablet, Take 1 tablet by mouth Every 6 (Six) Hours As Needed for Mild Pain., Disp: , Rfl:     alclometasone (ACLOVATE) 0.05 % cream, Apply 1 application topically to the appropriate area as directed 2 (Two) Times a Day., Disp: 60 g, Rfl: 2    cephalexin (KEFLEX) 500 MG capsule, Take 1 capsule by mouth 3 (Three) Times a Day for 10 days., Disp: 30 capsule, Rfl: 0    desonide (DESOWEN)  "0.05 % cream, Apply 1 Application topically to the appropriate area as directed 2 (Two) Times a Day., Disp: , Rfl:     fluocinonide (LIDEX) 0.05 % external solution, Apply  topically to the appropriate area as directed 2 (Two) Times a Day., Disp: 60 mL, Rfl: 0    halobetasol (ULTRAVATE) 0.05 % ointment, Apply 1 Application topically to the appropriate area as directed 2 (Two) Times a Day., Disp: , Rfl:     mupirocin (BACTROBAN) 2 % ointment, Apply 1 Application topically to the appropriate area as directed 2 (Two) Times a Day for 10 days., Disp: 20 g, Rfl: 0    nystatin (MYCOSTATIN) 781523 UNIT/GM cream, Apply 1 application topically to the appropriate area as directed 2 (Two) Times a Day., Disp: 30 g, Rfl: 2     Allergies     No Known Allergies    Social History       Social History     Social History Narrative    Not on file       Immunizations     Immunization:    There is no immunization history on file for this patient.       Objective     Objective     Vital Signs:   /84 (BP Location: Right arm, Patient Position: Sitting, Cuff Size: Adult)   Pulse 61   Temp 98 °F (36.7 °C) (Temporal)   Ht 188 cm (74\")   Wt 103 kg (227 lb 9.6 oz)   SpO2 98%   BMI 29.22 kg/m²       Physical Exam  Constitutional:       Appearance: Normal appearance.   HENT:      Nose: Nose normal.      Mouth/Throat:      Mouth: Mucous membranes are moist.   Cardiovascular:      Rate and Rhythm: Normal rate and regular rhythm.      Pulses: Normal pulses.      Heart sounds: Normal heart sounds.   Pulmonary:      Effort: Pulmonary effort is normal.      Breath sounds: Normal breath sounds.   Skin:     General: Skin is warm and dry.      Findings: Erythema (bilateral legs) present.   Neurological:      General: No focal deficit present.      Mental Status: He is alert and oriented to person, place, and time.   Psychiatric:         Mood and Affect: Mood normal.         Behavior: Behavior normal.         Physical Exam  Respiratory: Clear to " auscultation, no wheezing, rales or rhonchi  Cardiovascular: Regular rate and rhythm, no murmurs, rubs, or gallops  Extremities: Swelling noted in the ankle  Skin: Redness and swelling noted, consistent with cellulitis      Results    The following data was reviewed by: GILBERTO Saenz on 07/28/2025:          Results  Labs   - Kidney function: 01/2025, Normal   - Blood glucose: 01/2025, 89       Assessment and Plan        Assessment and Plan    Diagnoses and all orders for this visit:    1. Cellulitis of right lower extremity (Primary)  -     CBC w AUTO Differential; Future    2. Bilateral swelling of feet and ankles  -     Comprehensive metabolic panel; Future        Assessment & Plan  1. Cellulitis.  - Symptoms include swelling and redness of the ankle, with some improvement noted since starting cephalexin.  - Physical examination shows reduced swelling compared to initial presentation.  - A CBC will be ordered to monitor white blood cell count and ensure the effectiveness of the cephalexin. A CMP will also be conducted to assess kidney function due to swelling.  - Cephalexin prescribed by urgent care appears to be effective. Results will be communicated via paymio and a follow-up call will be made to ensure receipt.    2. Blood glucose management.  - Blood glucose level was 89 in 01/2025, within the normal range.  - A CMP will be conducted today. If blood glucose level is significantly elevated, an A1c test will be added based on the collected blood sample.        Follow Up        Follow Up   No follow-ups on file.  Patient was given instructions and counseling regarding his condition or for health maintenance advice. Please see specific information pulled into the AVS if appropriate.

## 2025-07-28 NOTE — PROGRESS NOTES
Chief Complaint     Edema (Bilateral ankle edema)    Patient or patient representative verbalized consent for the use of Ambient Listening during the visit with  GILBERTO Saenz for chart documentation. 7/28/2025  10:16 EDT    History of Present Illness     Dat Valencia is a 65 y.o. male who presents to [facility] .    History of Present Illness                   History      Past Medical History:   Diagnosis Date    COVID-19 01/2022    Medial meniscus tear        Past Surgical History:   Procedure Laterality Date    APPENDECTOMY      COLONOSCOPY      FRACTURE SURGERY      arm    KNEE ARTHROSCOPY W/ MENISCECTOMY Right 8/16/2022    Procedure: RIGHT KNEE ARTHROSCOPY WITH PARTIAL MEDIALAND PARTIAL LATERAL MENISCECTOMY ,CHONDROPLASTY;  Surgeon: Bry Betts MD;  Location: [facility] [place];  Service: Orthopedics;  Laterality: Right;       Family History   Problem Relation Age of Onset    Malig Hyperthermia Neg Hx         Current Medications        Current Outpatient Medications:     acetaminophen (TYLENOL) 500 MG tablet, Take 1 tablet by mouth Every 6 (Six) Hours As Needed for Mild Pain., Disp: , Rfl:     alclometasone (ACLOVATE) 0.05 % cream, Apply 1 application topically to the appropriate area as directed 2 (Two) Times a Day., Disp: 60 g, Rfl: 2    cephalexin (KEFLEX) 500 MG capsule, Take 1 capsule by mouth 3 (Three) Times a Day for 10 days., Disp: 30 capsule, Rfl: 0    desonide (DESOWEN) 0.05 % cream, Apply 1 Application topically to the appropriate area as directed 2 (Two) Times a Day., Disp: , Rfl:     fluocinonide (LIDEX) 0.05 % external solution, Apply  topically to the appropriate area as directed 2 (Two) Times a Day., Disp: 60 mL, Rfl: 0    halobetasol (ULTRAVATE) 0.05 % ointment, Apply 1 Application topically to the appropriate area as directed 2 (Two) Times a Day., Disp: , Rfl:     mupirocin (BACTROBAN) 2 % ointment, Apply 1 Application topically to the appropriate  "area as directed 2 (Two) Times a Day for 10 days., Disp: 20 g, Rfl: 0    nystatin (MYCOSTATIN) [id number] UNIT/GM cream, Apply 1 application topically to the appropriate area as directed 2 (Two) Times a Day., Disp: 30 g, Rfl: 2     Allergies     No Known Allergies    Social History       Social History     Social History Narrative    Not on file       Immunizations     Immunization:    There is no immunization history on file for this patient.       Objective     Objective     Vital Signs:   /84 (BP Location: Right arm, Patient Position: Sitting, Cuff Size: Adult)   Pulse 61   Temp 98 °F (36.7 °C) (Temporal)   Ht 188 cm (74\")   Wt 103 kg (227 lb 9.6 oz)   SpO2 98%   BMI 29.22 kg/m²       Physical Exam    Physical Exam                Results    The following data was reviewed by: GILBERTO Saenz on 07/28/2025:    {Ambulatory Labs:69334}  {Data Reviewed:30351}    Results                 Assessment and Plan        Assessment and Plan {CC Problem List  Visit Diagnosis  ROS  Review (Popup)  Health Maintenance  Quality  BestPractice  Medications  SmartSets  SnapShot Encounters  Media :23}   Diagnoses and all orders for this visit:    1. Cellulitis of right lower extremity (Primary)  -     CBC w AUTO Differential; Future    2. Bilateral swelling of feet and ankles  -     Comprehensive metabolic panel; Future        [unfilled]                {Time Spent (Optional):39812}  Follow Up        Follow Up {Instructions Charge Capture  Follow-up Communications :23}  No follow-ups on file.  Patient was given instructions and counseling regarding his condition or for health maintenance advice. Please see specific information pulled into the AVS if appropriate.      "

## (undated) DEVICE — STERILE POLYISOPRENE POWDER-FREE SURGICAL GLOVES: Brand: PROTEXIS

## (undated) DEVICE — 90-S CRUISE, SUCTION PROBE, NON-BENDABLE, MAX CUT LEVEL 1: Brand: SERFAS ENERGY

## (undated) DEVICE — STERILE POLYISOPRENE POWDER-FREE SURGICAL GLOVES WITH EMOLLIENT COATING: Brand: PROTEXIS

## (undated) DEVICE — GLV SURG SENSICARE PI ORTHO SZ8 LF STRL

## (undated) DEVICE — BNDG ELAS ECON W/CLIP 6IN 5YD LF STRL

## (undated) DEVICE — GAUZE,SPONGE,4"X4",16PLY,STRL,LF,10/TRAY: Brand: MEDLINE

## (undated) DEVICE — DRSNG GZ PETROLTM XEROFORM CURAD 1X8IN STRL

## (undated) DEVICE — GOWN,REINFORCE,POLY,SIRUS,BREATH SLV,XLG: Brand: MEDLINE

## (undated) DEVICE — SOL IRR NACL 0.9PCT 3000ML

## (undated) DEVICE — APPL CHLORAPREP HI/LITE 26ML ORNG

## (undated) DEVICE — KNEE ARTHROSCOPY-LF: Brand: MEDLINE INDUSTRIES, INC.

## (undated) DEVICE — CUFF TOURNI 1BLADDER 1PRT 24IN STRL

## (undated) DEVICE — SUT ETHLN 3-0 FS118IN 663H

## (undated) DEVICE — INTEGRATED CASSETTE TUBING, DO NOT USE IF PACKAGE IS DAMAGED: Brand: CROSSFLOW

## (undated) DEVICE — GLV SURG SENSICARE SLT PF LF 6 STRL

## (undated) DEVICE — BNDG ELAS ECON W/CLIP 4IN 5YD LF STRL

## (undated) DEVICE — BLD CUT FORMLA AGGR 3.5MM